# Patient Record
Sex: MALE | HISPANIC OR LATINO | URBAN - METROPOLITAN AREA
[De-identification: names, ages, dates, MRNs, and addresses within clinical notes are randomized per-mention and may not be internally consistent; named-entity substitution may affect disease eponyms.]

---

## 2018-06-28 ENCOUNTER — TRANSFERRED RECORDS (OUTPATIENT)
Dept: HEALTH INFORMATION MANAGEMENT | Facility: CLINIC | Age: 38
End: 2018-06-28

## 2019-01-30 DIAGNOSIS — Z31.41 FERTILITY TESTING: Primary | ICD-10-CM

## 2022-10-23 ENCOUNTER — APPOINTMENT (OUTPATIENT)
Dept: GENERAL RADIOLOGY | Facility: CLINIC | Age: 42
End: 2022-10-23
Attending: EMERGENCY MEDICINE
Payer: COMMERCIAL

## 2022-10-23 ENCOUNTER — HOSPITAL ENCOUNTER (INPATIENT)
Facility: CLINIC | Age: 42
LOS: 3 days | Discharge: HOME | End: 2022-10-26
Attending: EMERGENCY MEDICINE | Admitting: INTERNAL MEDICINE
Payer: COMMERCIAL

## 2022-10-23 DIAGNOSIS — S61.209A DEGLOVING INJURY OF FINGER, INITIAL ENCOUNTER: ICD-10-CM

## 2022-10-23 DIAGNOSIS — U07.1 INFECTION DUE TO 2019 NOVEL CORONAVIRUS: ICD-10-CM

## 2022-10-23 PROBLEM — E78.2 MIXED HYPERLIPIDEMIA: Chronic | Status: ACTIVE | Noted: 2022-10-23

## 2022-10-23 PROBLEM — E03.9 HYPOTHYROIDISM: Chronic | Status: ACTIVE | Noted: 2022-10-23

## 2022-10-23 LAB
ALBUMIN SERPL-MCNC: 4.1 G/DL (ref 3.4–5)
ALP SERPL-CCNC: 66 U/L (ref 40–150)
ALT SERPL W P-5'-P-CCNC: 40 U/L (ref 0–70)
ANION GAP SERPL CALCULATED.3IONS-SCNC: 4 MMOL/L (ref 3–14)
AST SERPL W P-5'-P-CCNC: 21 U/L (ref 0–45)
BASOPHILS # BLD MANUAL: 0 10E3/UL (ref 0–0.2)
BASOPHILS NFR BLD MANUAL: 0 %
BILIRUB SERPL-MCNC: 0.3 MG/DL (ref 0.2–1.3)
BUN SERPL-MCNC: 25 MG/DL (ref 7–30)
CALCIUM SERPL-MCNC: 9 MG/DL (ref 8.5–10.1)
CHLORIDE BLD-SCNC: 110 MMOL/L (ref 94–109)
CO2 SERPL-SCNC: 28 MMOL/L (ref 20–32)
CREAT SERPL-MCNC: 1.22 MG/DL (ref 0.66–1.25)
CRP SERPL-MCNC: <2.9 MG/L (ref 0–8)
EOSINOPHIL # BLD MANUAL: 0.1 10E3/UL (ref 0–0.7)
EOSINOPHIL NFR BLD MANUAL: 1 %
ERYTHROCYTE [DISTWIDTH] IN BLOOD BY AUTOMATED COUNT: 12.7 % (ref 10–15)
GFR SERPL CREATININE-BSD FRML MDRD: 75 ML/MIN/1.73M2
GLUCOSE BLD-MCNC: 111 MG/DL (ref 70–99)
HCT VFR BLD AUTO: 43 % (ref 40–53)
HGB BLD-MCNC: 14.5 G/DL (ref 13.3–17.7)
HOLD SPECIMEN: NORMAL
INR PPP: 1.06 (ref 0.85–1.15)
LYMPHOCYTES # BLD MANUAL: 4 10E3/UL (ref 0.8–5.3)
LYMPHOCYTES NFR BLD MANUAL: 31 %
MCH RBC QN AUTO: 29.3 PG (ref 26.5–33)
MCHC RBC AUTO-ENTMCNC: 33.7 G/DL (ref 31.5–36.5)
MCV RBC AUTO: 87 FL (ref 78–100)
MONOCYTES # BLD MANUAL: 0.9 10E3/UL (ref 0–1.3)
MONOCYTES NFR BLD MANUAL: 7 %
NEUTROPHILS # BLD MANUAL: 7.8 10E3/UL (ref 1.6–8.3)
NEUTROPHILS NFR BLD MANUAL: 61 %
PLAT MORPH BLD: NORMAL
PLATELET # BLD AUTO: 147 10E3/UL (ref 150–450)
POTASSIUM BLD-SCNC: 3.7 MMOL/L (ref 3.4–5.3)
PROT SERPL-MCNC: 7.7 G/DL (ref 6.8–8.8)
RBC # BLD AUTO: 4.95 10E6/UL (ref 4.4–5.9)
RBC MORPH BLD: NORMAL
SARS-COV-2 RNA RESP QL NAA+PROBE: POSITIVE
SODIUM SERPL-SCNC: 142 MMOL/L (ref 133–144)
WBC # BLD AUTO: 12.8 10E3/UL (ref 4–11)

## 2022-10-23 PROCEDURE — 250N000011 HC RX IP 250 OP 636

## 2022-10-23 PROCEDURE — 82040 ASSAY OF SERUM ALBUMIN: CPT | Performed by: INTERNAL MEDICINE

## 2022-10-23 PROCEDURE — 71046 X-RAY EXAM CHEST 2 VIEWS: CPT

## 2022-10-23 PROCEDURE — 85027 COMPLETE CBC AUTOMATED: CPT | Performed by: INTERNAL MEDICINE

## 2022-10-23 PROCEDURE — 73130 X-RAY EXAM OF HAND: CPT | Mod: LT

## 2022-10-23 PROCEDURE — 86140 C-REACTIVE PROTEIN: CPT | Performed by: INTERNAL MEDICINE

## 2022-10-23 PROCEDURE — C9803 HOPD COVID-19 SPEC COLLECT: HCPCS

## 2022-10-23 PROCEDURE — 99285 EMERGENCY DEPT VISIT HI MDM: CPT | Mod: 25

## 2022-10-23 PROCEDURE — G0378 HOSPITAL OBSERVATION PER HR: HCPCS

## 2022-10-23 PROCEDURE — 64450 NJX AA&/STRD OTHER PN/BRANCH: CPT | Mod: F2

## 2022-10-23 PROCEDURE — 80053 COMPREHEN METABOLIC PANEL: CPT | Performed by: INTERNAL MEDICINE

## 2022-10-23 PROCEDURE — 96375 TX/PRO/DX INJ NEW DRUG ADDON: CPT

## 2022-10-23 PROCEDURE — 96365 THER/PROPH/DIAG IV INF INIT: CPT

## 2022-10-23 PROCEDURE — 120N000001 HC R&B MED SURG/OB

## 2022-10-23 PROCEDURE — 36415 COLL VENOUS BLD VENIPUNCTURE: CPT | Performed by: EMERGENCY MEDICINE

## 2022-10-23 PROCEDURE — 99222 1ST HOSP IP/OBS MODERATE 55: CPT | Mod: AI | Performed by: INTERNAL MEDICINE

## 2022-10-23 PROCEDURE — 85007 BL SMEAR W/DIFF WBC COUNT: CPT | Performed by: INTERNAL MEDICINE

## 2022-10-23 PROCEDURE — 85610 PROTHROMBIN TIME: CPT | Performed by: INTERNAL MEDICINE

## 2022-10-23 PROCEDURE — 250N000011 HC RX IP 250 OP 636: Performed by: EMERGENCY MEDICINE

## 2022-10-23 PROCEDURE — 85379 FIBRIN DEGRADATION QUANT: CPT | Performed by: INTERNAL MEDICINE

## 2022-10-23 PROCEDURE — U0005 INFEC AGEN DETEC AMPLI PROBE: HCPCS | Performed by: EMERGENCY MEDICINE

## 2022-10-23 RX ORDER — LIDOCAINE 40 MG/G
CREAM TOPICAL
Status: DISCONTINUED | OUTPATIENT
Start: 2022-10-23 | End: 2022-10-26 | Stop reason: HOSPADM

## 2022-10-23 RX ORDER — ONDANSETRON 4 MG/1
4 TABLET, ORALLY DISINTEGRATING ORAL EVERY 6 HOURS PRN
Status: DISCONTINUED | OUTPATIENT
Start: 2022-10-23 | End: 2022-10-26 | Stop reason: HOSPADM

## 2022-10-23 RX ORDER — ACETAMINOPHEN 500 MG
500-1000 TABLET ORAL EVERY 6 HOURS PRN
COMMUNITY

## 2022-10-23 RX ORDER — LEVOTHYROXINE SODIUM 137 UG/1
137 TABLET ORAL DAILY
COMMUNITY

## 2022-10-23 RX ORDER — OXYCODONE HYDROCHLORIDE 5 MG/1
5 TABLET ORAL EVERY 4 HOURS PRN
Status: DISCONTINUED | OUTPATIENT
Start: 2022-10-23 | End: 2022-10-25

## 2022-10-23 RX ORDER — CEFAZOLIN SODIUM 2 G/100ML
2 INJECTION, SOLUTION INTRAVENOUS EVERY 8 HOURS
Status: DISCONTINUED | OUTPATIENT
Start: 2022-10-24 | End: 2022-10-24

## 2022-10-23 RX ORDER — ACETAMINOPHEN 325 MG/1
650 TABLET ORAL EVERY 6 HOURS PRN
Status: DISCONTINUED | OUTPATIENT
Start: 2022-10-23 | End: 2022-10-26 | Stop reason: HOSPADM

## 2022-10-23 RX ORDER — LIDOCAINE 40 MG/G
CREAM TOPICAL
Status: DISCONTINUED | OUTPATIENT
Start: 2022-10-23 | End: 2022-10-24

## 2022-10-23 RX ORDER — FENTANYL CITRATE 50 UG/ML
100 INJECTION, SOLUTION INTRAMUSCULAR; INTRAVENOUS ONCE
Status: COMPLETED | OUTPATIENT
Start: 2022-10-23 | End: 2022-10-23

## 2022-10-23 RX ORDER — POLYETHYLENE GLYCOL 3350 17 G/17G
17 POWDER, FOR SOLUTION ORAL DAILY PRN
Status: DISCONTINUED | OUTPATIENT
Start: 2022-10-23 | End: 2022-10-26 | Stop reason: HOSPADM

## 2022-10-23 RX ORDER — HYDROMORPHONE HYDROCHLORIDE 1 MG/ML
.5-1 INJECTION, SOLUTION INTRAMUSCULAR; INTRAVENOUS; SUBCUTANEOUS
Status: DISCONTINUED | OUTPATIENT
Start: 2022-10-23 | End: 2022-10-24

## 2022-10-23 RX ORDER — CEFAZOLIN SODIUM 2 G/100ML
2 INJECTION, SOLUTION INTRAVENOUS ONCE
Status: COMPLETED | OUTPATIENT
Start: 2022-10-23 | End: 2022-10-23

## 2022-10-23 RX ORDER — ONDANSETRON 2 MG/ML
4 INJECTION INTRAMUSCULAR; INTRAVENOUS EVERY 6 HOURS PRN
Status: DISCONTINUED | OUTPATIENT
Start: 2022-10-23 | End: 2022-10-26 | Stop reason: HOSPADM

## 2022-10-23 RX ORDER — PROCHLORPERAZINE 25 MG
25 SUPPOSITORY, RECTAL RECTAL EVERY 12 HOURS PRN
Status: DISCONTINUED | OUTPATIENT
Start: 2022-10-23 | End: 2022-10-26 | Stop reason: HOSPADM

## 2022-10-23 RX ORDER — IBUPROFEN 600 MG/1
600 TABLET, FILM COATED ORAL EVERY 6 HOURS PRN
Status: DISCONTINUED | OUTPATIENT
Start: 2022-10-23 | End: 2022-10-26 | Stop reason: HOSPADM

## 2022-10-23 RX ORDER — ACETAMINOPHEN 650 MG/1
650 SUPPOSITORY RECTAL EVERY 6 HOURS PRN
Status: DISCONTINUED | OUTPATIENT
Start: 2022-10-23 | End: 2022-10-26 | Stop reason: HOSPADM

## 2022-10-23 RX ORDER — BISACODYL 10 MG
10 SUPPOSITORY, RECTAL RECTAL DAILY PRN
Status: DISCONTINUED | OUTPATIENT
Start: 2022-10-23 | End: 2022-10-26 | Stop reason: HOSPADM

## 2022-10-23 RX ORDER — FENTANYL CITRATE 50 UG/ML
INJECTION, SOLUTION INTRAMUSCULAR; INTRAVENOUS
Status: COMPLETED
Start: 2022-10-23 | End: 2022-10-23

## 2022-10-23 RX ORDER — AMOXICILLIN 250 MG
1 CAPSULE ORAL 2 TIMES DAILY PRN
Status: DISCONTINUED | OUTPATIENT
Start: 2022-10-23 | End: 2022-10-26 | Stop reason: HOSPADM

## 2022-10-23 RX ORDER — PROCHLORPERAZINE MALEATE 5 MG
10 TABLET ORAL EVERY 6 HOURS PRN
Status: DISCONTINUED | OUTPATIENT
Start: 2022-10-23 | End: 2022-10-26 | Stop reason: HOSPADM

## 2022-10-23 RX ORDER — HYDROMORPHONE HYDROCHLORIDE 1 MG/ML
.5-1 INJECTION, SOLUTION INTRAMUSCULAR; INTRAVENOUS; SUBCUTANEOUS
Status: DISCONTINUED | OUTPATIENT
Start: 2022-10-23 | End: 2022-10-26 | Stop reason: HOSPADM

## 2022-10-23 RX ORDER — AMOXICILLIN 250 MG
2 CAPSULE ORAL 2 TIMES DAILY PRN
Status: DISCONTINUED | OUTPATIENT
Start: 2022-10-23 | End: 2022-10-26 | Stop reason: HOSPADM

## 2022-10-23 RX ADMIN — FENTANYL CITRATE 100 MCG: 50 INJECTION, SOLUTION INTRAMUSCULAR; INTRAVENOUS at 19:25

## 2022-10-23 RX ADMIN — HYDROMORPHONE HYDROCHLORIDE 1 MG: 1 INJECTION, SOLUTION INTRAMUSCULAR; INTRAVENOUS; SUBCUTANEOUS at 21:15

## 2022-10-23 RX ADMIN — CEFAZOLIN SODIUM 2 G: 2 INJECTION, SOLUTION INTRAVENOUS at 19:55

## 2022-10-23 ASSESSMENT — ENCOUNTER SYMPTOMS: WOUND: 1

## 2022-10-23 ASSESSMENT — ACTIVITIES OF DAILY LIVING (ADL)
ADLS_ACUITY_SCORE: 35

## 2022-10-23 NOTE — ED TRIAGE NOTES
30 minutes PTA was working in garage and finger got caught in a machine. L middle finger disfigured, appears partially amputated with bone visible. Bleeding controlled.      Triage Assessment       Row Name 10/23/22 3480       Triage Assessment (Adult)    Airway WDL WDL       Respiratory WDL    Respiratory WDL WDL       Skin Circulation/Temperature WDL    Skin Circulation/Temperature WDL WDL       Cardiac WDL    Cardiac WDL WDL       Peripheral/Neurovascular WDL    Peripheral Neurovascular WDL WDL       Cognitive/Neuro/Behavioral WDL    Cognitive/Neuro/Behavioral WDL WDL

## 2022-10-23 NOTE — ED PROVIDER NOTES
"  History   Chief Complaint:  Hand Injury       The history is provided by the patient.      Mode Wolfe is a right-handed 42 year old male with history of hypothyroidism who presents with a hand injury. Patient reports he got his hand stuck in a machine while working on concrete at 1800, resulting in a significant laceration to his left middle finger. Rates the pain is an 8/10.     Review of Systems   Skin: Positive for wound.   All other systems reviewed and are negative.    Allergies:  No Known Allergies    Medications:  Synthroid     Past Medical History:     Hypothyroidism   Migraine    Family History:    Father - Hypertension, Thyroid disease, High cholesterol     Social History:  The patient presents to the ED with siblings and mother.   PCP: No Ref-Primary, Physician     Physical Exam     Patient Vitals for the past 24 hrs:   BP Temp Temp src Pulse Resp SpO2 Height Weight   10/23/22 2230 116/77 -- -- 60 18 99 % -- --   10/23/22 2210 -- -- -- 65 17 -- -- --   10/23/22 2100 -- -- -- 61 18 96 % -- --   10/23/22 1945 -- -- -- 73 17 -- -- --   10/23/22 1930 110/81 -- -- 67 15 93 % -- --   10/23/22 1915 -- -- -- 64 26 97 % -- --   10/23/22 1900 (!) 111/90 -- -- 66 22 96 % -- --   10/23/22 1854 117/78 -- -- 67 14 96 % -- --   10/23/22 1800 (!) 181/158 96.9  F (36.1  C) Temporal 75 16 97 % 1.676 m (5' 6\") 68 kg (150 lb)       Physical Exam  Constitutional: Well developed, nontox appearance  Head: Atraumatic.   Neck:  no stridor  Eyes: no scleral icterus  Cardiovascular: RRR, 2+ left radial pulse  Pulmonary/Chest: nml resp effort  Ext: Warm, well perfused   LUE: Degloved third finger with fracture dislocation at DIP, middle phalanx exposed, limited range of motion  Neurological: A&O, symmetric facies, moves ext x4  Skin: Skin is warm and dry.  Psychiatric: Behavior is normal. Thought content normal.   Nursing note and vitals reviewed.                  Emergency Department Course     Imaging:  XR Chest 2 Views "   Final Result   IMPRESSION: Negative chest.      XR Hand Left G/E 3 Views   Final Result   IMPRESSION: Near amputation of the distal portion of the middle finger, the distal phalanx is dislocated ulnarly and proximally and the soft tissues along the middle phalanx are displaced ulnarly along with the dislocated distal phalanx, exposing the    middle phalanx. There is a small bone fragment of the base of the distal phalanx which maintains articulation with the head of the middle phalanx. There is gas in the soft tissues near the base of the middle finger and the hand.        Report per radiology    Laboratory:  Labs Ordered and Resulted from Time of ED Arrival to Time of ED Departure   COVID-19 VIRUS (CORONAVIRUS) BY PCR - Abnormal       Result Value    SARS CoV2 PCR Positive (*)    COMPREHENSIVE METABOLIC PANEL - Abnormal    Sodium 142      Potassium 3.7      Chloride 110 (*)     Carbon Dioxide (CO2) 28      Anion Gap 4      Urea Nitrogen 25      Creatinine 1.22      Calcium 9.0      Glucose 111 (*)     Alkaline Phosphatase 66      AST 21      ALT 40      Protein Total 7.7      Albumin 4.1      Bilirubin Total 0.3      GFR Estimate 75     CBC WITH PLATELETS AND DIFFERENTIAL - Abnormal    WBC Count 12.8 (*)     RBC Count 4.95      Hemoglobin 14.5      Hematocrit 43.0      MCV 87      MCH 29.3      MCHC 33.7      RDW 12.7      Platelet Count 147 (*)    INR - Normal    INR 1.06     CRP INFLAMMATION - Normal    CRP Inflammation <2.9     D DIMER QUANTITATIVE - Normal    D-Dimer Quantitative <0.27     DIFFERENTIAL    % Neutrophils 61      % Lymphocytes 31      % Monocytes 7      % Eosinophils 1      % Basophils 0      Absolute Neutrophils 7.8      Absolute Lymphocytes 4.0      Absolute Monocytes 0.9      Absolute Eosinophils 0.1      Absolute Basophils 0.0      RBC Morphology Confirmed RBC Indices      Platelet Assessment        Value: Automated Count Confirmed. Platelet morphology is normal.        Procedures   Digital  Block       Procedure: Digital Block    Indication: Wound care    Consent: Verbal     Preparation: Alcohol    Anesthesia/Sedation: Bupivacaine - 0.5%     Location: L third (middle) finger    Procedure Detail: A digital block was performed on the indicated digit with the above anesthetic.     Patient status: The patient tolerated the procedure well: Yes. There were no complications.      Emergency Department Course:    Reviewed:  I reviewed nursing notes, vitals, past medical history and Care Everywhere    Assessments:  ED Course as of 10/24/22 0159   Sun Oct 23, 2022   1922 I obtained history and examined the patient.    2032 I rechecked the patient and explained findings.    2033 Spoke to Dr. Crespo from Orthopedics regarding the patient.    2105 I rechecked the patient and explained findings.    2115 I spoke to Dr. Lau, hospitalist, who accepts the patient for admission.      Interventions:  Medications   ceFAZolin (ANCEF) 2 g in 100 mL D5W intermittent infusion (has no administration in time range)   HYDROmorphone (PF) (DILAUDID) injection 0.5-1 mg (0.5 mg Intravenous Not Given 10/24/22 0059)   melatonin tablet 1 mg (has no administration in time range)   ondansetron (ZOFRAN ODT) ODT tab 4 mg (has no administration in time range)     Or   ondansetron (ZOFRAN) injection 4 mg (has no administration in time range)   lidocaine 1 % 0.1-1 mL (has no administration in time range)   lidocaine (LMX4) cream (has no administration in time range)   sodium chloride (PF) 0.9% PF flush 3 mL (has no administration in time range)   sodium chloride (PF) 0.9% PF flush 3 mL (3 mLs Intracatheter Given 10/24/22 0101)   acetaminophen (TYLENOL) tablet 650 mg (has no administration in time range)     Or   acetaminophen (TYLENOL) Suppository 650 mg (has no administration in time range)   ibuprofen (ADVIL/MOTRIN) tablet 600 mg (has no administration in time range)   oxyCODONE (ROXICODONE) tablet 5 mg (has no administration in time  range)   HYDROmorphone (PF) (DILAUDID) injection 0.5-1 mg (1 mg Intravenous Given 10/24/22 0059)   senna-docusate (SENOKOT-S/PERICOLACE) 8.6-50 MG per tablet 1 tablet (has no administration in time range)     Or   senna-docusate (SENOKOT-S/PERICOLACE) 8.6-50 MG per tablet 2 tablet (has no administration in time range)   polyethylene glycol (MIRALAX) Packet 17 g (has no administration in time range)   bisacodyl (DULCOLAX) suppository 10 mg (has no administration in time range)   prochlorperazine (COMPAZINE) injection 10 mg (has no administration in time range)     Or   prochlorperazine (COMPAZINE) tablet 10 mg (has no administration in time range)     Or   prochlorperazine (COMPAZINE) suppository 25 mg (has no administration in time range)   lidocaine 1 % 0.1-1 mL (has no administration in time range)   lidocaine (LMX4) cream (has no administration in time range)   sodium chloride (PF) 0.9% PF flush 3 mL (3 mLs Intracatheter Given 10/24/22 0100)   sodium chloride (PF) 0.9% PF flush 3 mL (has no administration in time range)   Medication instructions: Do NOT use nebulized medications (has no administration in time range)   fentaNYL (PF) (SUBLIMAZE) injection 100 mcg (100 mcg Intravenous Given 10/23/22 1925)   ceFAZolin (ANCEF) 2 g in 100 mL D5W intermittent infusion (0 g Intravenous Stopped 10/23/22 2221)   HYDROmorphone (DILAUDID) injection 1 mg (1 mg Intravenous Given 10/23/22 2115)     Disposition:  The patient was admitted to the hospital under the care of Dr. Lau.     Impression & Plan     CMS Diagnoses: None    Medical Decision Makin year old male presenting w/ open fracture and degloving injury to left third finger, patient is right-hand dominant     Last meal at ~1500     Presentation consistent with open fracture of left third finger with degloving.  Ancef given as noted above.  Tetanus up-to-date (2019).  No other concern for significant trauma other than the areas imaged as above based on history and  physical exam.  Imaging sig for acute osseous abnormalities as described above.  Interventions as noted above with improvement in symptoms.  Digital nerve block performed as noted above.  I discussed the patient with on-call orthopedics who recommended admission observation for operative management tomorrow.  Patient was counseled on all results, diagnosis and disposition.  They are understanding and agreeable to plan. Patient discharged in stable condition.       Diagnosis:    ICD-10-CM    1. Degloving injury of finger, initial encounter  S61.209A       2. Infection due to 2019 novel coronavirus  U07.1           Scribe Disclosure:  I, FATMATA HOOD, am serving as a scribe at 6:59 PM on 10/23/2022 to document services personally performed by Guille Boyd MD based on my observations and the provider's statements to me.          Guille Boyd MD  10/24/22 0159

## 2022-10-24 ENCOUNTER — ANESTHESIA (OUTPATIENT)
Dept: SURGERY | Facility: CLINIC | Age: 42
End: 2022-10-24
Payer: COMMERCIAL

## 2022-10-24 ENCOUNTER — ANESTHESIA EVENT (OUTPATIENT)
Dept: SURGERY | Facility: CLINIC | Age: 42
End: 2022-10-24
Payer: COMMERCIAL

## 2022-10-24 ENCOUNTER — VIRTUAL VISIT (OUTPATIENT)
Dept: INTERPRETER SERVICES | Facility: CLINIC | Age: 42
End: 2022-10-24

## 2022-10-24 LAB — D DIMER PPP FEU-MCNC: <0.27 UG/ML FEU (ref 0–0.5)

## 2022-10-24 PROCEDURE — G0378 HOSPITAL OBSERVATION PER HR: HCPCS

## 2022-10-24 PROCEDURE — 250N000013 HC RX MED GY IP 250 OP 250 PS 637: Performed by: INTERNAL MEDICINE

## 2022-10-24 PROCEDURE — 258N000003 HC RX IP 258 OP 636: Performed by: REGISTERED NURSE

## 2022-10-24 PROCEDURE — 272N000001 HC OR GENERAL SUPPLY STERILE: Performed by: ORTHOPAEDIC SURGERY

## 2022-10-24 PROCEDURE — 258N000003 HC RX IP 258 OP 636: Performed by: PHYSICIAN ASSISTANT

## 2022-10-24 PROCEDURE — 360N000075 HC SURGERY LEVEL 2, PER MIN: Performed by: ORTHOPAEDIC SURGERY

## 2022-10-24 PROCEDURE — 120N000001 HC R&B MED SURG/OB

## 2022-10-24 PROCEDURE — 88300 SURGICAL PATH GROSS: CPT | Mod: 26 | Performed by: PATHOLOGY

## 2022-10-24 PROCEDURE — 88300 SURGICAL PATH GROSS: CPT | Mod: TC | Performed by: ORTHOPAEDIC SURGERY

## 2022-10-24 PROCEDURE — 250N000011 HC RX IP 250 OP 636: Performed by: INTERNAL MEDICINE

## 2022-10-24 PROCEDURE — 250N000011 HC RX IP 250 OP 636: Performed by: PHYSICIAN ASSISTANT

## 2022-10-24 PROCEDURE — 0HRGX73 REPLACEMENT OF LEFT HAND SKIN WITH AUTOLOGOUS TISSUE SUBSTITUTE, FULL THICKNESS, EXTERNAL APPROACH: ICD-10-PCS | Performed by: ORTHOPAEDIC SURGERY

## 2022-10-24 PROCEDURE — 0X6R0Z1 DETACHMENT AT LEFT MIDDLE FINGER, HIGH, OPEN APPROACH: ICD-10-PCS | Performed by: ORTHOPAEDIC SURGERY

## 2022-10-24 PROCEDURE — 250N000009 HC RX 250: Performed by: ORTHOPAEDIC SURGERY

## 2022-10-24 PROCEDURE — 99232 SBSQ HOSP IP/OBS MODERATE 35: CPT | Performed by: INTERNAL MEDICINE

## 2022-10-24 PROCEDURE — 250N000011 HC RX IP 250 OP 636: Performed by: REGISTERED NURSE

## 2022-10-24 PROCEDURE — 370N000017 HC ANESTHESIA TECHNICAL FEE, PER MIN: Performed by: ORTHOPAEDIC SURGERY

## 2022-10-24 RX ORDER — CEFAZOLIN SODIUM/WATER 2 G/20 ML
2 SYRINGE (ML) INTRAVENOUS
Status: DISCONTINUED | OUTPATIENT
Start: 2022-10-24 | End: 2022-10-24

## 2022-10-24 RX ORDER — PROPOFOL 10 MG/ML
INJECTION, EMULSION INTRAVENOUS CONTINUOUS PRN
Status: DISCONTINUED | OUTPATIENT
Start: 2022-10-24 | End: 2022-10-24

## 2022-10-24 RX ORDER — PROPOFOL 10 MG/ML
INJECTION, EMULSION INTRAVENOUS PRN
Status: DISCONTINUED | OUTPATIENT
Start: 2022-10-24 | End: 2022-10-24

## 2022-10-24 RX ORDER — SODIUM CHLORIDE, SODIUM LACTATE, POTASSIUM CHLORIDE, CALCIUM CHLORIDE 600; 310; 30; 20 MG/100ML; MG/100ML; MG/100ML; MG/100ML
INJECTION, SOLUTION INTRAVENOUS CONTINUOUS
Status: DISCONTINUED | OUTPATIENT
Start: 2022-10-24 | End: 2022-10-25

## 2022-10-24 RX ORDER — FENTANYL CITRATE 0.05 MG/ML
INJECTION, SOLUTION INTRAMUSCULAR; INTRAVENOUS PRN
Status: DISCONTINUED | OUTPATIENT
Start: 2022-10-24 | End: 2022-10-24

## 2022-10-24 RX ORDER — NALOXONE HYDROCHLORIDE 0.4 MG/ML
0.4 INJECTION, SOLUTION INTRAMUSCULAR; INTRAVENOUS; SUBCUTANEOUS
Status: DISCONTINUED | OUTPATIENT
Start: 2022-10-24 | End: 2022-10-26 | Stop reason: HOSPADM

## 2022-10-24 RX ORDER — CEPHALEXIN 500 MG/1
500 CAPSULE ORAL EVERY 12 HOURS SCHEDULED
Status: DISCONTINUED | OUTPATIENT
Start: 2022-10-25 | End: 2022-10-26 | Stop reason: HOSPADM

## 2022-10-24 RX ORDER — DEXAMETHASONE SODIUM PHOSPHATE 4 MG/ML
INJECTION, SOLUTION INTRA-ARTICULAR; INTRALESIONAL; INTRAMUSCULAR; INTRAVENOUS; SOFT TISSUE PRN
Status: DISCONTINUED | OUTPATIENT
Start: 2022-10-24 | End: 2022-10-24

## 2022-10-24 RX ORDER — NALOXONE HYDROCHLORIDE 0.4 MG/ML
0.2 INJECTION, SOLUTION INTRAMUSCULAR; INTRAVENOUS; SUBCUTANEOUS
Status: DISCONTINUED | OUTPATIENT
Start: 2022-10-24 | End: 2022-10-26 | Stop reason: HOSPADM

## 2022-10-24 RX ORDER — CEFAZOLIN SODIUM/WATER 2 G/20 ML
2 SYRINGE (ML) INTRAVENOUS SEE ADMIN INSTRUCTIONS
Status: DISCONTINUED | OUTPATIENT
Start: 2022-10-24 | End: 2022-10-24

## 2022-10-24 RX ORDER — LIDOCAINE 40 MG/G
CREAM TOPICAL
Status: DISCONTINUED | OUTPATIENT
Start: 2022-10-24 | End: 2022-10-25

## 2022-10-24 RX ORDER — ONDANSETRON 2 MG/ML
INJECTION INTRAMUSCULAR; INTRAVENOUS PRN
Status: DISCONTINUED | OUTPATIENT
Start: 2022-10-24 | End: 2022-10-24

## 2022-10-24 RX ORDER — SODIUM CHLORIDE, SODIUM LACTATE, POTASSIUM CHLORIDE, CALCIUM CHLORIDE 600; 310; 30; 20 MG/100ML; MG/100ML; MG/100ML; MG/100ML
INJECTION, SOLUTION INTRAVENOUS CONTINUOUS PRN
Status: DISCONTINUED | OUTPATIENT
Start: 2022-10-24 | End: 2022-10-24

## 2022-10-24 RX ORDER — BUPIVACAINE HYDROCHLORIDE 5 MG/ML
INJECTION, SOLUTION PERINEURAL PRN
Status: DISCONTINUED | OUTPATIENT
Start: 2022-10-24 | End: 2022-10-24

## 2022-10-24 RX ORDER — CEFAZOLIN SODIUM 1 G/3ML
1 INJECTION, POWDER, FOR SOLUTION INTRAMUSCULAR; INTRAVENOUS EVERY 8 HOURS
Status: COMPLETED | OUTPATIENT
Start: 2022-10-24 | End: 2022-10-25

## 2022-10-24 RX ADMIN — HYDROMORPHONE HYDROCHLORIDE 1 MG: 1 INJECTION, SOLUTION INTRAMUSCULAR; INTRAVENOUS; SUBCUTANEOUS at 00:59

## 2022-10-24 RX ADMIN — SODIUM CHLORIDE, POTASSIUM CHLORIDE, SODIUM LACTATE AND CALCIUM CHLORIDE: 600; 310; 30; 20 INJECTION, SOLUTION INTRAVENOUS at 17:10

## 2022-10-24 RX ADMIN — DEXAMETHASONE SODIUM PHOSPHATE 4 MG: 4 INJECTION, SOLUTION INTRA-ARTICULAR; INTRALESIONAL; INTRAMUSCULAR; INTRAVENOUS; SOFT TISSUE at 14:59

## 2022-10-24 RX ADMIN — ACETAMINOPHEN 650 MG: 325 TABLET, FILM COATED ORAL at 18:11

## 2022-10-24 RX ADMIN — CEFAZOLIN SODIUM 2 G: 2 INJECTION, SOLUTION INTRAVENOUS at 14:44

## 2022-10-24 RX ADMIN — PROPOFOL 150 MCG/KG/MIN: 10 INJECTION, EMULSION INTRAVENOUS at 14:54

## 2022-10-24 RX ADMIN — CEFAZOLIN SODIUM 2 G: 2 INJECTION, SOLUTION INTRAVENOUS at 05:37

## 2022-10-24 RX ADMIN — ONDANSETRON 4 MG: 2 INJECTION INTRAMUSCULAR; INTRAVENOUS at 06:28

## 2022-10-24 RX ADMIN — MIDAZOLAM 2 MG: 1 INJECTION INTRAMUSCULAR; INTRAVENOUS at 14:50

## 2022-10-24 RX ADMIN — DEXAMETHASONE SODIUM PHOSPHATE 4 MG: 4 INJECTION, SOLUTION INTRA-ARTICULAR; INTRALESIONAL; INTRAMUSCULAR; INTRAVENOUS; SOFT TISSUE at 15:01

## 2022-10-24 RX ADMIN — CEFAZOLIN SODIUM 2 G: 2 INJECTION, SOLUTION INTRAVENOUS at 11:41

## 2022-10-24 RX ADMIN — OXYCODONE HYDROCHLORIDE 5 MG: 5 TABLET ORAL at 18:19

## 2022-10-24 RX ADMIN — HYDROMORPHONE HYDROCHLORIDE 1 MG: 1 INJECTION, SOLUTION INTRAMUSCULAR; INTRAVENOUS; SUBCUTANEOUS at 20:10

## 2022-10-24 RX ADMIN — SODIUM CHLORIDE, POTASSIUM CHLORIDE, SODIUM LACTATE AND CALCIUM CHLORIDE: 600; 310; 30; 20 INJECTION, SOLUTION INTRAVENOUS at 14:43

## 2022-10-24 RX ADMIN — HYDROMORPHONE HYDROCHLORIDE 0.5 MG: 1 INJECTION, SOLUTION INTRAMUSCULAR; INTRAVENOUS; SUBCUTANEOUS at 05:41

## 2022-10-24 RX ADMIN — CEFAZOLIN 1 G: 1 INJECTION, POWDER, FOR SOLUTION INTRAMUSCULAR; INTRAVENOUS at 22:09

## 2022-10-24 RX ADMIN — ONDANSETRON 4 MG: 2 INJECTION INTRAMUSCULAR; INTRAVENOUS at 14:44

## 2022-10-24 RX ADMIN — PROPOFOL 40 MG: 10 INJECTION, EMULSION INTRAVENOUS at 14:54

## 2022-10-24 RX ADMIN — HYDROMORPHONE HYDROCHLORIDE 0.5 MG: 1 INJECTION, SOLUTION INTRAMUSCULAR; INTRAVENOUS; SUBCUTANEOUS at 09:24

## 2022-10-24 RX ADMIN — FENTANYL CITRATE 25 MCG: 50 INJECTION INTRAVENOUS at 14:55

## 2022-10-24 ASSESSMENT — ACTIVITIES OF DAILY LIVING (ADL)
ADLS_ACUITY_SCORE: 35

## 2022-10-24 ASSESSMENT — LIFESTYLE VARIABLES: TOBACCO_USE: 0

## 2022-10-24 ASSESSMENT — ENCOUNTER SYMPTOMS
SEIZURES: 0
DYSRHYTHMIAS: 0

## 2022-10-24 ASSESSMENT — COPD QUESTIONNAIRES: COPD: 0

## 2022-10-24 NOTE — ED NOTES
Patient reports pain is tolerable at this level. Denies additional pain medication at this time, will continue to monitor.

## 2022-10-24 NOTE — ANESTHESIA PREPROCEDURE EVALUATION
Anesthesia Pre-Procedure Evaluation    Patient: Mode Wolfe   MRN: [id number] : 1980        Procedure : Procedure(s):  IRRIGATION AND DEBRIDEMENT, REVISION AMPUTATION LEFT 3RD FINGER          History reviewed. No pertinent past medical history.   History reviewed. No pertinent surgical history.   No Known Allergies   Social History     Tobacco Use     Smoking status: Never     Smokeless tobacco: Not on file   Substance Use Topics     Alcohol use: No      Wt Readings from Last 1 Encounters:   10/23/22 68 kg (150 lb)        Anesthesia Evaluation            ROS/MED HX  ENT/Pulmonary:    (-) tobacco use, asthma, COPD and sleep apnea   Neurologic:    (-) no seizures and no CVA   Cardiovascular:     (+) Dyslipidemia ----- (-) hypertension, CAD, CHF and arrhythmias   METS/Exercise Tolerance:     Hematologic:       Musculoskeletal: Comment: Degloving injury to L 3rd finger      GI/Hepatic:    (-) GERD and liver disease   Renal/Genitourinary:    (-) renal disease   Endo:     (+) thyroid problem, hypothyroidism,  (-) Type I DM and Type II DM   Psychiatric/Substance Use:       Infectious Disease:       Malignancy:       Other:            Physical Exam    Airway        Mallampati: II   TM distance: > 3 FB   Neck ROM: full   Mouth opening: > 3 cm    Respiratory Devices and Support         Dental  no notable dental history         Cardiovascular          Rhythm and rate: regular     Pulmonary           breath sounds clear to auscultation           OUTSIDE LABS:  CBC:   Lab Results   Component Value Date    WBC 12.8 (H) 10/23/2022    HGB 14.5 10/23/2022    HCT 43.0 10/23/2022     (L) 10/23/2022     BMP:   Lab Results   Component Value Date     10/23/2022    POTASSIUM 3.7 10/23/2022    CHLORIDE 110 (H) 10/23/2022    CO2 28 10/23/2022    BUN 25 10/23/2022    CR 1.22 10/23/2022     (H) 10/23/2022     COAGS:   Lab Results   Component Value Date    INR 1.06 10/23/2022     POC: No results found for:  BGM, HCG, HCGS  HEPATIC:   Lab Results   Component Value Date    ALBUMIN 4.1 10/23/2022    PROTTOTAL 7.7 10/23/2022    ALT 40 10/23/2022    AST 21 10/23/2022    ALKPHOS 66 10/23/2022    BILITOTAL 0.3 10/23/2022     OTHER:   Lab Results   Component Value Date    ELIEZER 9.0 10/23/2022    CRP <2.9 10/23/2022       Anesthesia Plan    ASA Status:  2      Anesthesia Type: Peripheral Nerve Block.              Consents    Anesthesia Plan(s) and associated risks, benefits, and realistic alternatives discussed. Questions answered and patient/representative(s) expressed understanding.    - Discussed:     - Discussed with:  Patient         Postoperative Care    Pain management: Multi-modal analgesia.   PONV prophylaxis: Ondansetron (or other 5HT-3)     Comments:    Other Comments: Median and ulnar nerve blocks            Genaro Luong MD

## 2022-10-24 NOTE — PHARMACY-ADMISSION MEDICATION HISTORY
Pharmacy Medication History  Admission medication history interview status for the 10/23/2022  admission is complete. See EPIC admission navigator for prior to admission medications     Location of Interview: Patient room  Medication history sources: Patient and Surescripts    Significant changes made to the medication list:      In the past week, patient estimated taking medication this percent of the time: greater than 90%    Additional medication history information:       Medication reconciliation completed by provider prior to medication history? No    Time spent in this activity: 5min     Prior to Admission medications    Medication Sig Last Dose Taking? Auth Provider Long Term End Date   acetaminophen (TYLENOL) 500 MG tablet Take 500-1,000 mg by mouth every 6 hours as needed for mild pain  Yes Unknown, Entered By History     levothyroxine (SYNTHROID/LEVOTHROID) 137 MCG tablet Take 137 mcg by mouth daily  Yes Unknown, Entered By History Yes        The information provided in this note is only as accurate as the sources available at the time of update(s)   Consuelo WinD

## 2022-10-24 NOTE — PROGRESS NOTES
Pt arrived to this unit at ~10 am. A&O x4. Up inde, VSS.RA. Denied pain. L hand dressing dry and intact, NWB.On Covid precaution. NPO. Pt went to surgery at ~1440.

## 2022-10-24 NOTE — ED NOTES
[facility]  ED Nurse Handoff Report    ED Chief complaint: Hand Injury      ED Diagnosis:   Final diagnoses:   Degloving injury of finger, initial encounter   Infection due to 2019 novel coronavirus       Code Status: To be addressed by admitting provider    Allergies: No Known Allergies    Patient Story: Patient presents from home for a finger injury after using a  at home and his finger got stuck in machine.     Focused Assessment:  A&Ox4. English speaking. Denies cp, sob, cough, fevers, n/v/d. L middle finger de-gloving, visible bone. Wound cleaned and bandaged. Pain managed by medications in ED. Independent with ambulation and cares. Plan for surgery in the morning, NPO after midnight. VSS.    Treatments and/or interventions provided: XR hand, dilaudid, Ancef  Medications   ceFAZolin (ANCEF) 2 g in 100 mL D5W intermittent infusion (has no administration in time range)   HYDROmorphone (PF) (DILAUDID) injection 0.5-1 mg (has no administration in time range)   melatonin tablet 1 mg (has no administration in time range)   ondansetron (ZOFRAN ODT) ODT tab 4 mg (has no administration in time range)     Or   ondansetron (ZOFRAN) injection 4 mg (has no administration in time range)   lidocaine 1 % 0.1-1 mL (has no administration in time range)   lidocaine (LMX4) cream (has no administration in time range)   sodium chloride (PF) 0.9% PF flush 3 mL (has no administration in time range)   sodium chloride (PF) 0.9% PF flush 3 mL (has no administration in time range)   acetaminophen (TYLENOL) tablet 650 mg (has no administration in time range)     Or   acetaminophen (TYLENOL) Suppository 650 mg (has no administration in time range)   ibuprofen (ADVIL/MOTRIN) tablet 600 mg (has no administration in time range)   oxyCODONE (ROXICODONE) tablet 5 mg (has no administration in time range)   HYDROmorphone (PF) (DILAUDID) injection 0.5-1 mg (has no administration in time range)   senna-docusate  (SENOKOT-S/PERICOLACE) 8.6-50 MG per tablet 1 tablet (has no administration in time range)     Or   senna-docusate (SENOKOT-S/PERICOLACE) 8.6-50 MG per tablet 2 tablet (has no administration in time range)   polyethylene glycol (MIRALAX) Packet 17 g (has no administration in time range)   bisacodyl (DULCOLAX) suppository 10 mg (has no administration in time range)   prochlorperazine (COMPAZINE) injection 10 mg (has no administration in time range)     Or   prochlorperazine (COMPAZINE) tablet 10 mg (has no administration in time range)     Or   prochlorperazine (COMPAZINE) suppository 25 mg (has no administration in time range)   lidocaine 1 % 0.1-1 mL (has no administration in time range)   lidocaine (LMX4) cream (has no administration in time range)   sodium chloride (PF) 0.9% PF flush 3 mL (has no administration in time range)   sodium chloride (PF) 0.9% PF flush 3 mL (has no administration in time range)   Medication instructions: Do NOT use nebulized medications (has no administration in time range)   fentaNYL (PF) (SUBLIMAZE) injection 100 mcg (100 mcg Intravenous Given 10/23/22 1925)   ceFAZolin (ANCEF) 2 g in 100 mL D5W intermittent infusion (0 g Intravenous Stopped 10/23/22 2221)   HYDROmorphone (DILAUDID) injection 1 mg (1 mg Intravenous Given 10/23/22 2115)     Patient's response to treatments and/or interventions: decreased pain    To be done/followed up on inpatient unit:  Continue with plan of care per admitting MD.    Does this patient have any cognitive concerns?: None    Activity level - Baseline/Home:  Independent  Activity Level - Current:   Independent    Patient's Preferred language: Romansh   Needed?: No    Isolation: COVID r/o and special precautions  Infection: COVID r/o and special precautions  Patient tested for COVID 19 prior to admission: YES  Bariatric?: No    Vital Signs:   Vitals:    10/23/22 1945 10/23/22 2100 10/23/22 2210 10/23/22 2230   BP:    116/77   Pulse: 73 61 65 60    Resp: 17 18 17 18   Temp:       TempSrc:       SpO2:  96%  99%   Weight:       Height:           Cardiac Rhythm:     Was the PSS-3 completed:   Yes  What interventions are required if any?               Family Comments: family at bedside.  OBS brochure/video discussed/provided to patient/family: Yes              For the majority of the shift this patient's behavior was Green.   Behavioral interventions performed were na.    ED NURSE PHONE NUMBER: *[phone]

## 2022-10-24 NOTE — ED NOTES
Assumed care at this time.  Alexandria Lam RN,.......................................... 10/24/2022   12:00 AM

## 2022-10-24 NOTE — PROGRESS NOTES
Ortho Brief:    41 yo male with Left long finger degloving injury and near amputation of distal phalanx after getting finger caught in a machine while working in his garage.     1. IV abx.   2. OR tomorrow with Dr. Crespo   3. NPO p mn  4. Hold anticoagulation if taken

## 2022-10-24 NOTE — PROGRESS NOTES
[facility]    Medicine Progress Note - Hospitalist Service    Date of Admission:  10/23/2022    Assessment & Plan   Mode Wolfe is a 42 year old male admitted on 10/23/2022. He presents to the emergency department after a degloving injury to his left third finger while working with a  in his garage.     Degloving injury of left third finger:  -Ancef 2 g every 8 hours  -Orthopedic surgery consulted, aware of patient from emergency department, plan in place for partial digit amputation today  -N.p.o. a  -Acetaminophen, oxycodone, IV Dilaudid available as needed for pain control.  Patient did receive a digital block in the ED initially.   -As needed bowel regimen available     Confirmed COVID-19 infection    - Asymptomatic. Tested positive on 10/23  - vaccinated x 2  - CXR no infiltrate. CRP <2.9, D-dimer <0.27  - no specific covid directed treatment at this time, he is asymptomatic, not hypoxic and no infiltrate on xray  - continue special precautions  - not on dvt prophy at this time given anticipated surgery    Leukocytosis  Wbc 12.4. this is likely stress response to above injury. CXR without infiltrate.COVID+,but asymptomatic  - monitor    Hypothyroidism:   Most recent TSH well controlled in July 2022 through his primary care system.  -Resume prior to admission levothyroxine 137 mcg daily at discharge.  Held given observation admission.          Diet: NPO per Anesthesia Guidelines for Procedure/Surgery Except for: Meds    DVT Prophylaxis: Ambulate every shift  Mann Catheter: Not present  Central Lines: None  Cardiac Monitoring: None  Code Status: Full Code      Disposition Plan      Expected Discharge Date: 10/24/2022                The patient's care was discussed with the Bedside Nurse, Patient and Patient's Family.    Elizabeth Kebede MD  Hospitalist Service  [facility]  Securely message with the Vocera Web Console (learn more  here)  Text page via [facility] Paging/Directory         Clinically Significant Risk Factors Present on Admission                              ______________________________________________________________________    Interval History   Some pain in his finger,but controlled.   Denies sob, chest pain, n/v or abdominal pain.  Awaiting ortho eval.    Data reviewed today: I reviewed all medications, new labs and imaging results over the last 24 hours. I personally reviewed no images or EKG's today.    Physical Exam   Vital Signs: Temp: 96.9  F (36.1  C) Temp src: Temporal BP: 113/76 Pulse: 58   Resp: 18 SpO2: 95 % O2 Device: None (Room air)    Weight: 150 lbs 0 oz  General Appearance: Alert, awake and no apparent distress  Respiratory: CTAB, no wheezing  Cardiovascular: regular rate and rhythm  GI: soft and non-tender  Skin: warm and dry  Other: Left hand in dressing    Data   Recent Labs   Lab 10/23/22  1844   WBC 12.8*   HGB 14.5   MCV 87   *   INR 1.06      POTASSIUM 3.7   CHLORIDE 110*   CO2 28   BUN 25   CR 1.22   ANIONGAP 4   ELIEZER 9.0   *   ALBUMIN 4.1   PROTTOTAL 7.7   BILITOTAL 0.3   ALKPHOS 66   ALT 40   AST 21     Recent Results (from the past 24 hour(s))   XR Hand Left G/E 3 Views    Narrative    EXAM: XR HAND LEFT G/E 3 VIEWS  LOCATION: [facility]  DATE/TIME: 10/23/2022 7:51 PM    INDICATION: trauma to hand with 3rd finger degloving  COMPARISON: None.      Impression    IMPRESSION: Near amputation of the distal portion of the middle finger, the distal phalanx is dislocated ulnarly and proximally and the soft tissues along the middle phalanx are displaced ulnarly along with the dislocated distal phalanx, exposing the   middle phalanx. There is a small bone fragment of the base of the distal phalanx which maintains articulation with the head of the middle phalanx. There is gas in the soft tissues near the base of the middle finger and the hand.   XR Chest 2 Views     Narrative    EXAM: XR CHEST 2 VIEWS  LOCATION: [facility]  DATE/TIME: 10/23/2022 10:23 PM    INDICATION: covid positive, pre op clearance  COMPARISON: None.      Impression    IMPRESSION: Negative chest.     Medications     - MEDICATION INSTRUCTIONS -         ceFAZolin  2 g Intravenous Q8H     sodium chloride (PF)  3 mL Intracatheter Q8H     sodium chloride (PF)  3 mL Intracatheter Q8H

## 2022-10-24 NOTE — OP NOTE
Procedure Date: 10/24/2022    PREOPERATIVE DIAGNOSIS:  Left hand crush injury with traumatic amputation, left long finger.    POSTOPERATIVE DIAGNOSIS:  Left hand crush injury with traumatic amputation, left long finger.    PROCEDURE PERFORMED:    1.  Revision amputation, left long finger, to the level of PIP joint.  2.  Full-thickness skin graft volar aspect of the long finger, 3 x 2 cm.  3.  Left long finger digital nerve block.    COMPLICATIONS:  Emesis during surgery.    SURGEON:  Bert Crespo MD    ASSISTANTS:  Henrique Jackson PA-C, whose assistance was critical for positioning the patient, retraction during revision amputation, skin grafting.    INDICATIONS FOR PROCEDURE:  A 42-year-old male who caught his hand in a  with the above-named injuries.  He was rinsed and given antibiotics.  He is indicated for the above-named procedure.  Risks of his injury and surgery discussed included, but not limited to bleeding, infection, damage to surrounding neurovascular structures, need for further higher level amputation.  He understands and wished to proceed.  Consent signed.  We used an .    DESCRIPTION OF PROCEDURE:  The patient was identified in preoperative holding area per hospital policy, correct operative site marked, to the OR and OR table.  Some sedation given.  He was given a digital block after cleaning the skin with 0.5% plain Marcaine, aspirating and then injecting the digital nerves.  He was given sedation with Anesthesia.  Betadine prep and draped.  The whole distal finger was just attached by the flexor tendon ulnarly and nonviable.  This was at the level of the DIP with degloving of the hand from the volar aspect of the proximal phalanx onward.  There was some viable skin on the dorsal aspect of the proximal phalanx.  Amputated at the level of the PIP.  We used a rongeur to contour and slightly shorten the head of the PIP.  Copiously irrigated the wounds.  No coverage  available volarly.  The skin from the amputated finger was thinned down, removed of fat and fenestrated, cleaned.  Loosely tacked in place over the volar aspect of the proximal phalanx long finger for a full-thickness skin graft, 2 x 3 cm. Tacked down centrally as well.  Sterile dressings applied with Xeroform.  Abrasions of the hand cleaned and dressed with Xeroform also.  Awakened transferred stable to PACU.    POSTOPERATIVE:    1.  P.o. antibiotics.  2.  Elevate.  3.  Come for a wound check this Friday or next Monday.    Bert Crespo MD        D: 10/24/2022   T: 10/24/2022   MT: [id number]    Name:     MARTINEZ VEGA  MRN:      [id number]        Account:        [id number]   :      1980           Procedure Date: 10/24/2022     Document: [id number]

## 2022-10-24 NOTE — PROGRESS NOTES
RECEIVING UNIT ED HANDOFF REVIEW    ED Nurse Handoff Report was reviewed by: Merlene Kim RN on October 24, 2022 at 9:29 AM

## 2022-10-24 NOTE — ED NOTES
Report given to boarding RN.  Alexandria Lam RN,.......................................... 10/24/2022   7:21 AM

## 2022-10-24 NOTE — ANESTHESIA CARE TRANSFER NOTE
Patient: Mode Wolfe    Procedure: Procedure(s):  IRRIGATION AND DEBRIDEMENT, REVISION AMPUTATION LEFT 3RD FINGER       Diagnosis: Open fracture of finger [S62.609B]  Diagnosis Additional Information: No value filed.    Anesthesia Type:   Peripheral Nerve Block, MAC     Note:    Oropharynx: oropharynx clear of all foreign objects and spontaneously breathing  Level of Consciousness: awake  Oxygen Supplementation: room air    Independent Airway: airway patency satisfactory and stable  Dentition: dentition unchanged  Vital Signs Stable: post-procedure vital signs reviewed and stable  Report to RN Given: handoff report given  Patient transferred to: PACU  Comments: Transferred to PACU, spontaneous RR, on room air.  Monitors and alarms on and functioning, VSS, patient awake and comfortable.  Report to PACU RN  Handoff Report: Identifed the Patient, Identified the Reponsible Provider, Reviewed the pertinent medical history, Discussed the surgical course, Reviewed Intra-OP anesthesia mangement and issues during anesthesia, Set expectations for post-procedure period and Allowed opportunity for questions and acknowledgement of understanding      Vitals:  Vitals Value Taken Time   BP     Temp     Pulse     Resp     SpO2         Electronically Signed By: ELIZABETH Davis CRNA  October 24, 2022  4:35 PM

## 2022-10-24 NOTE — CONSULTS
[facility]    Orthopedic Consultation    Mode Wolfe MRN# [id number]   Age: 42 year old YOB: 1980     Date of Admission: 10/23/2022    Reason for consult: Degloving/fracture left long finger        Requesting provider: Josr Lau        Level of consult: Consult, follow and place orders           Assessment and Plan:   Assessment:   Degloving/fracture left long finger  Covid +       Plan:   Patient scheduled for a left long finger revision amputation later today.  Surgeon: Dr Crespo  NPO Status effective now   NWB left hand   Hold anticoagulants if taken: none PTA   Keep dressings in place.  Continue Abx            Chief Complaint:   Left long finger pain          History of Present Illness:   Via :   Mode Wolfe is a 42 year old male who presented to the emergency department last evening after he got his left third finger caught in a  in his garage.  He was helping his brother with a project.  Subsequently with degloving injury and near detachment of distal phalanx of left third finger.  In the emergency department, a digital block was provided and orthopedic surgery was contacted.  Recommendation was for Ancef, n.p.o. status, and partial amputation today.   Incidentally, patient found to have a positive COVID-19 test.              Past Medical History:   No past medical history on file.          Past Surgical History:   No past surgical history on file.          Social History:     Social History     Tobacco Use     Smoking status: Never     Smokeless tobacco: Not on file   Substance Use Topics     Alcohol use: No             Family History:   No family history on file.          Immunizations:     VACCINE/DOSE   Diptheria   DPT   DTAP   HBIG   Hepatitis A   Hepatitis B   HIB   Influenza   Measles   Meningococcal   MMR   Mumps   Pneumococcal   Polio   Rubella   Small Pox   TDAP   Varicella   Zoster             Allergies:   No Known  Allergies          Medications:     Current Facility-Administered Medications   Medication     acetaminophen (TYLENOL) tablet 650 mg    Or     acetaminophen (TYLENOL) Suppository 650 mg     bisacodyl (DULCOLAX) suppository 10 mg     ceFAZolin (ANCEF) 2 g in 100 mL D5W intermittent infusion     ceFAZolin (ANCEF) 2 g in 100 mL D5W intermittent infusion     ceFAZolin (ANCEF) 2 g in 100 mL D5W intermittent infusion     HYDROmorphone (PF) (DILAUDID) injection 0.5-1 mg     ibuprofen (ADVIL/MOTRIN) tablet 600 mg     lidocaine (LMX4) cream     lidocaine (LMX4) cream     lidocaine 1 % 0.1-1 mL     lidocaine 1 % 0.1-1 mL     Medication instructions: Do NOT use nebulized medications     melatonin tablet 1 mg     ondansetron (ZOFRAN ODT) ODT tab 4 mg    Or     ondansetron (ZOFRAN) injection 4 mg     oxyCODONE (ROXICODONE) tablet 5 mg     polyethylene glycol (MIRALAX) Packet 17 g     prochlorperazine (COMPAZINE) injection 10 mg    Or     prochlorperazine (COMPAZINE) tablet 10 mg    Or     prochlorperazine (COMPAZINE) suppository 25 mg     senna-docusate (SENOKOT-S/PERICOLACE) 8.6-50 MG per tablet 1 tablet    Or     senna-docusate (SENOKOT-S/PERICOLACE) 8.6-50 MG per tablet 2 tablet     sodium chloride (PF) 0.9% PF flush 3 mL     sodium chloride (PF) 0.9% PF flush 3 mL     sodium chloride (PF) 0.9% PF flush 3 mL     sodium chloride (PF) 0.9% PF flush 3 mL             Review of Systems:   ROS:  10 point ROS neg other than the symptoms noted above in the HPI.            Physical Exam:   All vitals have been reviewed  Patient Vitals for the past 24 hrs:   BP Temp Temp src Pulse Resp SpO2 Height Weight   10/24/22 0959 114/63 97.7  F (36.5  C) Axillary 65 16 93 % -- --   10/24/22 0700 113/76 -- -- 58 -- 95 % -- --   10/24/22 0600 105/76 -- -- 65 -- 96 % -- --   10/24/22 0300 -- -- -- -- -- 95 % -- --   10/24/22 0100 -- -- -- -- -- 95 % -- --   10/23/22 2230 116/77 -- -- 60 18 99 % -- --   10/23/22 2210 -- -- -- 65 17 -- -- --  "  10/23/22 2100 -- -- -- 61 18 96 % -- --   10/23/22 1945 -- -- -- 73 17 -- -- --   10/23/22 1930 110/81 -- -- 67 15 93 % -- --   10/23/22 1915 -- -- -- 64 26 97 % -- --   10/23/22 1900 (!) 111/90 -- -- 66 22 96 % -- --   10/23/22 1854 117/78 -- -- 67 14 96 % -- --   10/23/22 1800 (!) 181/158 96.9  F (36.1  C) Temporal 75 16 97 % 1.676 m (5' 6\") 68 kg (150 lb)     No intake or output data in the 24 hours ending 10/24/22 1218      Physical Exam   Temp: 97.7  F (36.5  C) Temp src: Axillary BP: 114/63 Pulse: 65   Resp: 16 SpO2: 93 % O2 Device: None (Room air)    Vital Signs with Ranges  Temp:  [96.9  F (36.1  C)-97.7  F (36.5  C)] 97.7  F (36.5  C)  Pulse:  [58-75] 65  Resp:  [14-26] 16  BP: (105-181)/() 114/63  SpO2:  [93 %-99 %] 93 %  150 lbs 0 oz    Constitutional: Pleasant, alert, appropriate, following commands.  HEENT: Head atraumatic normocephalic. Pupils equal round and reactive to light.  Respiratory: Unlabored breathing no audible wheeze  Cardiovascular: Regular rate and rhythm per pulses  GI: Abdomen non-distended.  Lymph/Hematologic: No lymphadenopathy in areas examined  Genitourinary:  No angel  Skin: No rashes, no cyanosis, no edema.  Musculoskeletal: Images of the left long finger reviewed demonstrating degloving of the long finger with distal fracture.  Mild drainage on dressings.  Sensation intact along distal remaining fingers.    Neurologic: normal without focal findings, mental status, speech normal, alert and oriented x iii  Neuropsychiatric: stable             Data:   All laboratory data reviewed  Results for orders placed or performed during the hospital encounter of 10/23/22   XR Hand Left G/E 3 Views     Status: None    Narrative    EXAM: XR HAND LEFT G/E 3 VIEWS  LOCATION: [facility]  DATE/TIME: 10/23/2022 7:51 PM    INDICATION: trauma to hand with 3rd finger degloving  COMPARISON: None.      Impression    IMPRESSION: Near amputation of the distal portion of the " middle finger, the distal phalanx is dislocated ulnarly and proximally and the soft tissues along the middle phalanx are displaced ulnarly along with the dislocated distal phalanx, exposing the   middle phalanx. There is a small bone fragment of the base of the distal phalanx which maintains articulation with the head of the middle phalanx. There is gas in the soft tissues near the base of the middle finger and the hand.   XR Chest 2 Views     Status: None    Narrative    EXAM: XR CHEST 2 VIEWS  LOCATION: [facility]  DATE/TIME: 10/23/2022 10:23 PM    INDICATION: covid positive, pre op clearance  COMPARISON: None.      Impression    IMPRESSION: Negative chest.   [place] Draw     Status: None    Narrative    The following orders were created for panel order [place] Draw.  Procedure                               Abnormality         Status                     ---------                               -----------         ------                     Extra Blue Top Tube[[id number]]                              Final result               Extra Green Top (Lithium...[[id number]]                      Final result               Extra Purple Top Tube[[id number]]                            Final result                 Please view results for these tests on the individual orders.   Asymptomatic COVID-19 Virus (Coronavirus) by PCR Nasopharyngeal     Status: Abnormal    Specimen: Nasopharyngeal; Swab   Result Value Ref Range    SARS CoV2 PCR Positive (A) Negative    Narrative    Testing was performed using the Xpert Xpress SARS-CoV-2 Assay on the   Cepheid Gene-Xpert Instrument Systems. Additional information about   this Emergency Use Authorization (EUA) assay can be found via the Lab   Guide. This test should be ordered for the detection of SARS-CoV-2 in   individuals who meet SARS-CoV-2 clinical and/or epidemiological   criteria. Test performance is unknown in asymptomatic patients. This   test is for in vitro  diagnostic use under the FDA EUA for   laboratories certified under CLIA to perform high complexity testing.   This test has not been FDA cleared or approved. A negative result   does not rule out the presence of PCR inhibitors in the specimen or   target RNA in concentration below the limit of detection for the   assay. The possibility of a false negative should be considered if   the patient's recent exposure or clinical presentation suggests   COVID-19. This test was validated by the [facility]. This laboratory is certified under the Clinical Laboratory Improvement Amendments of 1988 (CLIA-88) as qualified to perform high complexity laboratory testing.     Extra Blue Top Tube     Status: None   Result Value Ref Range    Hold Specimen JIC    Extra Green Top (Lithium Heparin) Tube     Status: None   Result Value Ref Range    Hold Specimen JIC    Extra Purple Top Tube     Status: None   Result Value Ref Range    Hold Specimen JIC    Comprehensive metabolic panel     Status: Abnormal   Result Value Ref Range    Sodium 142 133 - 144 mmol/L    Potassium 3.7 3.4 - 5.3 mmol/L    Chloride 110 (H) 94 - 109 mmol/L    Carbon Dioxide (CO2) 28 20 - 32 mmol/L    Anion Gap 4 3 - 14 mmol/L    Urea Nitrogen 25 7 - 30 mg/dL    Creatinine 1.22 0.66 - 1.25 mg/dL    Calcium 9.0 8.5 - 10.1 mg/dL    Glucose 111 (H) 70 - 99 mg/dL    Alkaline Phosphatase 66 40 - 150 U/L    AST 21 0 - 45 U/L    ALT 40 0 - 70 U/L    Protein Total 7.7 6.8 - 8.8 g/dL    Albumin 4.1 3.4 - 5.0 g/dL    Bilirubin Total 0.3 0.2 - 1.3 mg/dL    GFR Estimate 75 >60 mL/min/1.73m2   INR     Status: Normal   Result Value Ref Range    INR 1.06 0.85 - 1.15   CRP inflammation     Status: Normal   Result Value Ref Range    CRP Inflammation <2.9 0.0 - 8.0 mg/L   CBC with platelets and differential     Status: Abnormal   Result Value Ref Range    WBC Count 12.8 (H) 4.0 - 11.0 10e3/uL    RBC Count 4.95 4.40 - 5.90 10e6/uL    Hemoglobin 14.5  13.3 - 17.7 g/dL    Hematocrit 43.0 40.0 - 53.0 %    MCV 87 78 - 100 fL    MCH 29.3 26.5 - 33.0 pg    MCHC 33.7 31.5 - 36.5 g/dL    RDW 12.7 10.0 - 15.0 %    Platelet Count 147 (L) 150 - 450 10e3/uL   Manual Differential     Status: None   Result Value Ref Range    % Neutrophils 61 %    % Lymphocytes 31 %    % Monocytes 7 %    % Eosinophils 1 %    % Basophils 0 %    Absolute Neutrophils 7.8 1.6 - 8.3 10e3/uL    Absolute Lymphocytes 4.0 0.8 - 5.3 10e3/uL    Absolute Monocytes 0.9 0.0 - 1.3 10e3/uL    Absolute Eosinophils 0.1 0.0 - 0.7 10e3/uL    Absolute Basophils 0.0 0.0 - 0.2 10e3/uL    RBC Morphology Confirmed RBC Indices     Platelet Assessment  Automated Count Confirmed. Platelet morphology is normal.     Automated Count Confirmed. Platelet morphology is normal.   D dimer quantitative     Status: Normal   Result Value Ref Range    D-Dimer Quantitative <0.27 0.00 - 0.50 ug/mL FEU    Narrative    This D-dimer assay is intended for use in conjunction with a clinical pretest probability assessment model to exclude pulmonary embolism (PE) and deep venous thrombosis (DVT) in outpatients suspected of PE or DVT. The cut-off value is 0.50 ug/mL FEU.   CBC with Platelets & Differential     Status: Abnormal    Narrative    The following orders were created for panel order CBC with Platelets & Differential.  Procedure                               Abnormality         Status                     ---------                               -----------         ------                     CBC with platelets and d...[[id number]]  Abnormal            Final result               Manual Differential[[id number]]                              Final result                 Please view results for these tests on the individual orders.          Attestation:  I have reviewed today's vital signs, notes, medications, labs and imaging with Dr. Crespo.  Amount of time performed on this consult: 45 minutes.    Coty Ivan PA-C

## 2022-10-24 NOTE — BRIEF OP NOTE
[facility]    Brief Operative Note    Pre-operative diagnosis: Open fracture of finger [S62.609B]  Post-operative diagnosis Same as pre-operative diagnosis    Procedure: Procedure(s):  IRRIGATION AND DEBRIDEMENT, REVISION AMPUTATION LEFT 3RD FINGER with full thickness skin graft   Surgeon: Surgeon(s) and Role:     * Bert Crespo MD - Primary     * Henrique Jackson PA-C - Assisting  Anesthesia: General   Estimated Blood Loss: Less than 10 ml    Drains: None  Specimens: * No specimens in log *  Findings:   None.  Complications: None.  Implants: * No implants in log *      Dressing in place until Friday follow up clinic my office [facility]  [facility]    Monday 1-5 PM  and Thursday 7:30-12:00 AM  [address] [place] [address]  [phone]    [phone]    Or    Tuesday 7:30-5 PM  [address] [facility] [address]  [place], [place]

## 2022-10-25 LAB
FASTING STATUS PATIENT QL REPORTED: ABNORMAL
GLUCOSE BLD-MCNC: 155 MG/DL (ref 70–99)
HGB BLD-MCNC: 14.3 G/DL (ref 13.3–17.7)

## 2022-10-25 PROCEDURE — 250N000011 HC RX IP 250 OP 636: Performed by: PHYSICIAN ASSISTANT

## 2022-10-25 PROCEDURE — 250N000013 HC RX MED GY IP 250 OP 250 PS 637: Performed by: INTERNAL MEDICINE

## 2022-10-25 PROCEDURE — 250N000011 HC RX IP 250 OP 636: Performed by: INTERNAL MEDICINE

## 2022-10-25 PROCEDURE — 250N000013 HC RX MED GY IP 250 OP 250 PS 637: Performed by: PHYSICIAN ASSISTANT

## 2022-10-25 PROCEDURE — 36415 COLL VENOUS BLD VENIPUNCTURE: CPT | Performed by: FAMILY MEDICINE

## 2022-10-25 PROCEDURE — 82947 ASSAY GLUCOSE BLOOD QUANT: CPT | Performed by: FAMILY MEDICINE

## 2022-10-25 PROCEDURE — 99232 SBSQ HOSP IP/OBS MODERATE 35: CPT | Performed by: INTERNAL MEDICINE

## 2022-10-25 PROCEDURE — 999N000111 HC STATISTIC OT IP EVAL DEFER: Performed by: OCCUPATIONAL THERAPIST

## 2022-10-25 PROCEDURE — 85018 HEMOGLOBIN: CPT | Performed by: PHYSICIAN ASSISTANT

## 2022-10-25 PROCEDURE — 120N000001 HC R&B MED SURG/OB

## 2022-10-25 PROCEDURE — 96372 THER/PROPH/DIAG INJ SC/IM: CPT | Performed by: INTERNAL MEDICINE

## 2022-10-25 RX ORDER — CEPHALEXIN 500 MG/1
500 CAPSULE ORAL EVERY 12 HOURS
Qty: 28 CAPSULE | Refills: 0 | Status: SHIPPED | OUTPATIENT
Start: 2022-10-25 | End: 2022-11-08

## 2022-10-25 RX ORDER — HYDROXYZINE HYDROCHLORIDE 25 MG/1
25 TABLET, FILM COATED ORAL EVERY 6 HOURS PRN
Status: DISCONTINUED | OUTPATIENT
Start: 2022-10-25 | End: 2022-10-26 | Stop reason: HOSPADM

## 2022-10-25 RX ORDER — POLYETHYLENE GLYCOL 3350 17 G/17G
17 POWDER, FOR SOLUTION ORAL DAILY PRN
Qty: 510 G | Refills: 0 | Status: SHIPPED | OUTPATIENT
Start: 2022-10-25

## 2022-10-25 RX ORDER — OXYCODONE HYDROCHLORIDE 5 MG/1
5-10 TABLET ORAL EVERY 4 HOURS PRN
Qty: 20 TABLET | Refills: 0 | Status: SHIPPED | OUTPATIENT
Start: 2022-10-25

## 2022-10-25 RX ORDER — OXYCODONE HYDROCHLORIDE 5 MG/1
5-10 TABLET ORAL EVERY 4 HOURS PRN
Status: DISCONTINUED | OUTPATIENT
Start: 2022-10-25 | End: 2022-10-26 | Stop reason: HOSPADM

## 2022-10-25 RX ORDER — IBUPROFEN 600 MG/1
600 TABLET, FILM COATED ORAL EVERY 6 HOURS PRN
Qty: 45 TABLET | Refills: 0 | Status: SHIPPED | OUTPATIENT
Start: 2022-10-25

## 2022-10-25 RX ORDER — ENOXAPARIN SODIUM 100 MG/ML
40 INJECTION SUBCUTANEOUS EVERY 24 HOURS
Status: DISCONTINUED | OUTPATIENT
Start: 2022-10-25 | End: 2022-10-26 | Stop reason: HOSPADM

## 2022-10-25 RX ORDER — HYDROXYZINE HYDROCHLORIDE 25 MG/1
50 TABLET, FILM COATED ORAL EVERY 6 HOURS PRN
Status: DISCONTINUED | OUTPATIENT
Start: 2022-10-25 | End: 2022-10-26 | Stop reason: HOSPADM

## 2022-10-25 RX ORDER — HYDROXYZINE HYDROCHLORIDE 25 MG/1
25 TABLET, FILM COATED ORAL EVERY 6 HOURS PRN
Qty: 30 TABLET | Refills: 0 | Status: SHIPPED | OUTPATIENT
Start: 2022-10-25

## 2022-10-25 RX ADMIN — HYDROMORPHONE HYDROCHLORIDE 1 MG: 1 INJECTION, SOLUTION INTRAMUSCULAR; INTRAVENOUS; SUBCUTANEOUS at 17:30

## 2022-10-25 RX ADMIN — ENOXAPARIN SODIUM 40 MG: 40 INJECTION SUBCUTANEOUS at 14:28

## 2022-10-25 RX ADMIN — OXYCODONE HYDROCHLORIDE 5 MG: 5 TABLET ORAL at 02:33

## 2022-10-25 RX ADMIN — CEFAZOLIN 1 G: 1 INJECTION, POWDER, FOR SOLUTION INTRAMUSCULAR; INTRAVENOUS at 05:42

## 2022-10-25 RX ADMIN — HYDROMORPHONE HYDROCHLORIDE 1 MG: 1 INJECTION, SOLUTION INTRAMUSCULAR; INTRAVENOUS; SUBCUTANEOUS at 09:13

## 2022-10-25 RX ADMIN — CEPHALEXIN 500 MG: 500 CAPSULE ORAL at 09:13

## 2022-10-25 RX ADMIN — OXYCODONE HYDROCHLORIDE 5 MG: 5 TABLET ORAL at 06:44

## 2022-10-25 RX ADMIN — OXYCODONE HYDROCHLORIDE 10 MG: 5 TABLET ORAL at 14:28

## 2022-10-25 RX ADMIN — CEPHALEXIN 500 MG: 500 CAPSULE ORAL at 19:53

## 2022-10-25 ASSESSMENT — ACTIVITIES OF DAILY LIVING (ADL)
ADLS_ACUITY_SCORE: 35

## 2022-10-25 NOTE — PROGRESS NOTES
Pt is A&O x4. VSS.Left finger incision site  ace wrap is CDI. Pt has intermittent numbness and tingling of the L fingers. CMS intact. Pt is voiding and drinking fluids well. IV saline lock patent. Pt stood at bedside and ambulated a few steps. Pain managed with Oxycodone and Hydromorphone. NWB to L finger. Remains on Covid precautions. Pt denies coughing/difficulty breathing or weakness.

## 2022-10-25 NOTE — ANESTHESIA POSTPROCEDURE EVALUATION
Patient: Mode Wolfe    Procedure: Procedure(s):  IRRIGATION AND DEBRIDEMENT, REVISION AMPUTATION LEFT 3RD FINGER       Anesthesia Type:  MAC    Note:     Postop Pain Control: Uneventful            Sign Out: Well controlled pain   PONV: No   Neuro/Psych: Uneventful            Sign Out: Acceptable/Baseline neuro status   Airway/Respiratory: Uneventful            Sign Out: Acceptable/Baseline resp. status   CV/Hemodynamics: Uneventful            Sign Out: Acceptable CV status; No obvious hypovolemia; No obvious fluid overload   Other NRE:    DID A NON-ROUTINE EVENT OCCUR? No    Event details/Postop Comments:  Planned anesthetic was MAC with digital block by surgeon.  Patient tolerated procedure/anesthetic well until closure, when patient vomited.  Head turned to right, suction applied, lung sounds clear postoperatively.  Stable SpO2.  No suspicion for aspiration.            Last vitals:  Vitals:    10/24/22 1700 10/24/22 1730 10/24/22 1830   BP: 138/75 104/76 123/76   Pulse: 96  70   Resp: 16 18 18   Temp:      SpO2: 96% 95% 94%       Electronically Signed By: Genaro Luong MD  October 24, 2022  7:46 PM

## 2022-10-25 NOTE — UTILIZATION REVIEW
Admission Status; Secondary Review Determination    Under the authority of the Utilization Management Committee, the utilization review process indicated a secondary review on the above patient. The review outcome is based on review of the medical records, discussions with staff, and applying clinical experience noted on the date of the review.    (x) Inpatient Status Appropriate - This patient's medical care is consistent with medical management for inpatient care and reasonable inpatient medical practice.    RATIONALE FOR DETERMINATION: 42-year-old male who had a left long finger degloving injury and near amputation of the distal phalanx after getting finger caught in a cement mixing machine in his garage.  Patient with severe post injury pain requiring IV antibiotics, IV fluids and underwent revision amputation, left long finger, to the level of PIP joint. Full-thickness skin graft volar aspect of the long finger, 3 x 2 cm. on 10/24/22.  The severity of patient's injury and the significant difficulty with perioperative pain control, patient will require multiple nights in the hospital appropriate for inpatient management.    At the time of admission with the information available to the attending physician more than 2 nights Hospital complex care was anticipated, based on patient risk of adverse outcome if treated as outpatient and complex care required. Inpatient admission is appropriate based on the Medicare guidelines.    This document was produced using voice recognition software    The information on this document is developed by the utilization review team in order for the business office to ensure compliance. This only denotes the appropriateness of proper admission status and does not reflect the quality of care rendered.    The definitions of Inpatient Status and Observation Status used in making the determination above are those provided in the CMS Coverage Manual, Chapter 1 and Chapter 6, section  70.4.    Sincerely,    James Eid MD  Utilization Review  Physician Advisor  [facility].

## 2022-10-25 NOTE — PLAN OF CARE
OT: orders received and chart reviewed and met with pt. Educated on OT role. Pt was ind at baseline, no biggest limiting factor is pain. Pt does not feel he has any difficulty with dressing, bathing or bathroom transfers. Pt has eaten breakfast this morning without difficulty. Educated to ice and elevate hand. Pt very receptive to info and thankful for care. No acute OT needs. Pt plans to discharge to [facility] for a few days. Will complete OT orders

## 2022-10-25 NOTE — PROGRESS NOTES
[facility]    Medicine Progress Note - Hospitalist Service    Date of Admission:  10/23/2022    Assessment & Plan   Mode Wolfe is a 42 year old male admitted on 10/23/2022. He presents to the emergency department after a degloving injury to his left third finger while working with a  in his garage.     Degloving injury of left third finger   S/p revision amputation, left long finger to the level of PIP joint, full thickness skin graft volar aspect of long finger on 10/24  - routine post op care per orthopedics  - on Keflex 500mg BID      Confirmed COVID-19 infection    - Asymptomatic. Tested positive on 10/23  - vaccinated x 2  - CXR no infiltrate. CRP <2.9, D-dimer <0.27  - no specific covid directed treatment at this time, he is asymptomatic, not hypoxic and no infiltrate on xray  - continue special precautions  - not on dvt prophy at this time given anticipated surgery    Leukocytosis  Wbc 12.4. this is likely stress response to above injury. CXR without infiltrate.COVID+,but asymptomatic    Hypothyroidism:   Most recent TSH well controlled in July 2022 through his primary care system.  -Resume prior to admission levothyroxine 137 mcg daily at discharge.  Held given observation admission.          Diet: Advance Diet as Tolerated: Regular Diet Adult    DVT Prophylaxis: Ambulate every shift  Mann Catheter: Not present  Central Lines: None  Cardiac Monitoring: None  Code Status: Full Code      Disposition Plan  anticipate discharge today if cleared by ortho.      Expected Discharge Date: 10/25/2022                The patient's care was discussed with the Bedside Nurse and Patient.    Elizabeth Kebede MD  Hospitalist Service  [facility]  Securely message with the Vocera Web Console (learn more here)  Text page via "[employer] Paging/Directory         Clinically Significant Risk Factors Present on Admission                      # Overweight:  "Estimated body mass index is 29.64 kg/m  as calculated from the following:    Height as of this encounter: 1.676 m (5' 6\").    Weight as of this encounter: 83.3 kg (183 lb 10.3 oz).           ______________________________________________________________________    Interval History   Reports pain at surgical site. States once pain medication wears out, he gets throbbing pain. He is worried about going home because of pain.   Otherwise denies cp, sob, nausea, vomiting or abdominal pain.    Data reviewed today: I reviewed all medications, new labs and imaging results over the last 24 hours. I personally reviewed no images or EKG's today.    Physical Exam   Vital Signs: Temp: 99  F (37.2  C) Temp src: Oral BP: 112/71 Pulse: 62   Resp: 16 SpO2: 95 % O2 Device: None (Room air)    Weight: 183 lbs 10.29 oz  General Appearance: Alert, awake and no apparent distress  Respiratory: CTAB, no wheezing  Cardiovascular: regular rate and rhythm  GI: soft and non-tender  Skin: warm and dry  Other: Left hand in dressing    Data   Recent Labs   Lab 10/25/22  0828 10/23/22  1844   WBC  --  12.8*   HGB 14.3 14.5   MCV  --  87   PLT  --  147*   INR  --  1.06   NA  --  142   POTASSIUM  --  3.7   CHLORIDE  --  110*   CO2  --  28   BUN  --  25   CR  --  1.22   ANIONGAP  --  4   ELIEZER  --  9.0   * 111*   ALBUMIN  --  4.1   PROTTOTAL  --  7.7   BILITOTAL  --  0.3   ALKPHOS  --  66   ALT  --  40   AST  --  21     No results found for this or any previous visit (from the past 24 hour(s)).  Medications     - MEDICATION INSTRUCTIONS -         cephALEXin  500 mg Oral Q12H SHY (08/20)     sodium chloride (PF)  3 mL Intracatheter Q8H     sodium chloride (PF)  3 mL Intracatheter Q8H     "

## 2022-10-25 NOTE — PROGRESS NOTES
Orthopedic Surgery  Mode Wolfe  10/25/2022     Admit Date:  10/23/2022  POD: 1 Day Post-Op   Procedure(s):  IRRIGATION AND DEBRIDEMENT, REVISION AMPUTATION LEFT 3RD FINGER    Alert and oriented.   Patient resting comfortably in bed.    Pain poorly controlled.  Reporting a shooting pain into his forearm.    Tolerating oral intake.    Denies nausea or vomiting  Denies chest pain or shortness of breath.    Temp:  [97.4  F (36.3  C)-99.6  F (37.6  C)] 99  F (37.2  C)  Pulse:  [57-96] 62  Resp:  [16-20] 16  BP: (104-138)/(58-77) 112/71  SpO2:  [94 %-97 %] 95 %    Dressings are clean, dry and intact.   Able to flex and extend remaining fingers without difficulty.   TTP along the left forearm musculature, compressible compartments.  No erythema.    Full sensation to light touch.   Radial pulse present.   Capillary refill <2 seconds.     Labs:  Recent Labs   Lab Test 10/25/22  0828 10/23/22  1844   WBC  --  12.8*   HGB 14.3 14.5   PLT  --  147*     Recent Labs   Lab Test 10/23/22  1844   INR 1.06     Recent Labs   Lab Test 10/23/22  1844   CRP <2.9     1. PLAN:   Ambulation for DVT prophylaxis.     Avoid pressure left hand.     Increased oxycodone and added atarax.    Dressings: Keep intact.     Follow-up: Friday or Monday with Dr Crespo     2. Disposition   Anticipate d/c to home tomorrow if pain controlled.      Coty Ivan PA-C

## 2022-10-25 NOTE — PROGRESS NOTES
Pt came back from surgery at ~1630. VSS.RA. Pain managed with Tyl and Oxy. Uses urinal. Adequate I&O. L finger incision site dry and intact. NWB to L finger. IV infusing. Covid precaution.

## 2022-10-26 VITALS
BODY MASS INDEX: 30.12 KG/M2 | TEMPERATURE: 98.9 F | SYSTOLIC BLOOD PRESSURE: 115 MMHG | WEIGHT: 187.39 LBS | HEART RATE: 56 BPM | OXYGEN SATURATION: 96 % | HEIGHT: 66 IN | DIASTOLIC BLOOD PRESSURE: 71 MMHG | RESPIRATION RATE: 16 BRPM

## 2022-10-26 LAB
GLUCOSE BLD-MCNC: 107 MG/DL (ref 70–99)
HGB BLD-MCNC: 14.7 G/DL (ref 13.3–17.7)
PATH REPORT.COMMENTS IMP SPEC: NORMAL
PATH REPORT.COMMENTS IMP SPEC: NORMAL
PATH REPORT.FINAL DX SPEC: NORMAL
PATH REPORT.GROSS SPEC: NORMAL
PATH REPORT.MICROSCOPIC SPEC OTHER STN: NORMAL
PATH REPORT.RELEVANT HX SPEC: NORMAL
PHOTO IMAGE: NORMAL

## 2022-10-26 PROCEDURE — 82947 ASSAY GLUCOSE BLOOD QUANT: CPT | Performed by: INTERNAL MEDICINE

## 2022-10-26 PROCEDURE — 99238 HOSP IP/OBS DSCHRG MGMT 30/<: CPT | Performed by: INTERNAL MEDICINE

## 2022-10-26 PROCEDURE — 250N000013 HC RX MED GY IP 250 OP 250 PS 637: Performed by: PHYSICIAN ASSISTANT

## 2022-10-26 PROCEDURE — 250N000011 HC RX IP 250 OP 636: Performed by: INTERNAL MEDICINE

## 2022-10-26 PROCEDURE — 85018 HEMOGLOBIN: CPT | Performed by: PHYSICIAN ASSISTANT

## 2022-10-26 PROCEDURE — 36415 COLL VENOUS BLD VENIPUNCTURE: CPT | Performed by: INTERNAL MEDICINE

## 2022-10-26 RX ADMIN — CEPHALEXIN 500 MG: 500 CAPSULE ORAL at 09:07

## 2022-10-26 RX ADMIN — HYDROXYZINE HYDROCHLORIDE 25 MG: 25 TABLET, FILM COATED ORAL at 09:07

## 2022-10-26 RX ADMIN — OXYCODONE HYDROCHLORIDE 10 MG: 5 TABLET ORAL at 16:13

## 2022-10-26 RX ADMIN — ENOXAPARIN SODIUM 40 MG: 40 INJECTION SUBCUTANEOUS at 14:36

## 2022-10-26 RX ADMIN — OXYCODONE HYDROCHLORIDE 10 MG: 5 TABLET ORAL at 12:59

## 2022-10-26 RX ADMIN — OXYCODONE HYDROCHLORIDE 10 MG: 5 TABLET ORAL at 09:06

## 2022-10-26 RX ADMIN — HYDROXYZINE HYDROCHLORIDE 50 MG: 25 TABLET, FILM COATED ORAL at 16:38

## 2022-10-26 ASSESSMENT — ACTIVITIES OF DAILY LIVING (ADL)
ADLS_ACUITY_SCORE: 35

## 2022-10-26 NOTE — DISCHARGE SUMMARY
[facility]  Hospitalist Discharge Summary      Date of Admission:  10/23/2022  Date of Discharge:  10/26/2022  Discharging Provider: Elizabeth Kebede MD  Discharge Service: Hospitalist Service    Discharge Diagnoses   See below    Follow-ups Needed After Discharge   Follow-up Appointments     Follow-up and recommended labs and tests       Follow up with Dr. Crespo , at [facility], on 10/28  to evaluate after   surgery. No follow up labs or test are needed.  Call for appointment: [phone]  After hours: [phone]  Fax: [phone] or [phone]             Unresulted Labs Ordered in the Past 30 Days of this Admission     Date and Time Order Name Status Description    10/24/2022  4:13 PM Surgical Pathology Exam In process       These results will be followed up by orthopedics    Discharge Disposition   Discharged to home  Condition at discharge: Stable  Patient ready to discharge to a skilled nursing facility as soon as possible in order to create capacity for patients related to the COVID-19 pandemic.    Hospital Course   Mode Wolfe is a 42 year old male admitted on 10/23/2022. He presents to the emergency department after a degloving injury to his left third finger while working with a  in his garage. Hospital course as below.     Degloving injury of left third finger   S/p revision amputation, left long finger to the level of PIP joint, full thickness skin graft volar aspect of long finger on 10/24  Pain is currently controlled. Managed by orthopedics postoperatively.  He will discharge on Keflex 500mg BID for prophylaxis.   - follow up with Orthopedics on 10/28 or 10/31    Confirmed COVID-19 infection    - Asymptomatic. Tested positive on 10/23  - vaccinated x 2  - CXR no infiltrate. CRP <2.9, D-dimer <0.27  - no specific covid directed treatment at this time, he is asymptomatic, not hypoxic and no infiltrate on xray  - received dvt prophy on pod#1 since  remained in hospital  - counseled on isolation precautions    Leukocytosis  Wbc 12.4. this is likely stress response to above injury. CXR without infiltrate.COVID+,but asymptomatic    Hypothyroidism:   Most recent TSH well controlled in July 2022 through his primary care system.  -Resume prior to admission levothyroxine 137 mcg daily          Consultations This Hospital Stay   ORTHOPEDIC SURGERY IP CONSULT  OCCUPATIONAL THERAPY ADULT IP CONSULT  PHYSICAL THERAPY ADULT IP CONSULT    Code Status   Full Code    Time Spent on this Encounter   I, Elizabeth Kebede MD, personally saw the patient today and spent 25 minutes discharging this patient.       Elizabeth Kebede MD  [facility] ORTHOPEDICS SPINE  [address] [facility] [address]  Phone: [phone]  Fax: [phone]  ______________________________________________________________________    Physical Exam   Vital Signs: Temp: 99.1  F (37.3  C) Temp src: Oral BP: 136/86 Pulse: 58   Resp: 18 SpO2: 97 % O2 Device: None (Room air)    Weight: 187 lbs 6.26 oz  General Appearance: Alert,awake and no apparent distress  Respiratory: clear to auscultation bilaterally  Cardiovascular: no wheezing or rhonchi  GI: soft and non-tender  Skin: warm and dry  MSK: left hand in dressing       Primary Care Physician   Physician No Ref-Primary    Discharge Orders      Reason for your hospital stay    Left 3rd finger degloving injury with revision amputation     Follow-up and recommended labs and tests     Follow up with Dr. Crespo , at [facility], on 10/28  to evaluate after surgery. No follow up labs or test are needed.  Call for appointment: [phone]  After hours: [phone]  Fax: [phone] or [phone]     Activity    Your activity upon discharge: activity as tolerated and no driving while on analgesics     Wound care and dressings    Instructions to care for your wound at home: ice to area for comfort, reinforce dressing as needed, and  none needed.     Discharge Instructions    You have tested positive for COVID19 on 10/23/22. Recommend isolation for 10 days.  If your remain asymptomatic, you are able to go out, but need to wear mask from day 6-10.     Diet    Follow this diet upon discharge: Orders Placed This Encounter      Advance Diet as Tolerated: Regular Diet Adult       Significant Results and Procedures   Most Recent 3 CBC's:Recent Labs   Lab Test 10/26/22  0835 10/25/22  0828 10/23/22  1844   WBC  --   --  12.8*   HGB 14.7 14.3 14.5   MCV  --   --  87   PLT  --   --  147*     Most Recent 3 BMP's:Recent Labs   Lab Test 10/26/22  0835 10/25/22  0828 10/23/22  1844   NA  --   --  142   POTASSIUM  --   --  3.7   CHLORIDE  --   --  110*   CO2  --   --  28   BUN  --   --  25   CR  --   --  1.22   ANIONGAP  --   --  4   ELIEZER  --   --  9.0   * 155* 111*   ,   Results for orders placed or performed during the hospital encounter of 10/23/22   XR Hand Left G/E 3 Views    Narrative    EXAM: XR HAND LEFT G/E 3 VIEWS  LOCATION: [facility]  DATE/TIME: 10/23/2022 7:51 PM    INDICATION: trauma to hand with 3rd finger degloving  COMPARISON: None.      Impression    IMPRESSION: Near amputation of the distal portion of the middle finger, the distal phalanx is dislocated ulnarly and proximally and the soft tissues along the middle phalanx are displaced ulnarly along with the dislocated distal phalanx, exposing the   middle phalanx. There is a small bone fragment of the base of the distal phalanx which maintains articulation with the head of the middle phalanx. There is gas in the soft tissues near the base of the middle finger and the hand.   XR Chest 2 Views    Narrative    EXAM: XR CHEST 2 VIEWS  LOCATION: [facility]  DATE/TIME: 10/23/2022 10:23 PM    INDICATION: covid positive, pre op clearance  COMPARISON: None.      Impression    IMPRESSION: Negative chest.       Discharge Medications   Current  Discharge Medication List      START taking these medications    Details   cephALEXin (KEFLEX) 500 MG capsule Take 1 capsule (500 mg) by mouth every 12 hours for 14 days  Qty: 28 capsule, Refills: 0    Associated Diagnoses: Degloving injury of finger, initial encounter      hydrOXYzine (ATARAX) 25 MG tablet Take 1 tablet (25 mg) by mouth every 6 hours as needed for other (adjuvant pain)  Qty: 30 tablet, Refills: 0    Associated Diagnoses: Degloving injury of finger, initial encounter      ibuprofen (ADVIL/MOTRIN) 600 MG tablet Take 1 tablet (600 mg) by mouth every 6 hours as needed for inflammatory pain  Qty: 45 tablet, Refills: 0    Associated Diagnoses: Degloving injury of finger, initial encounter      oxyCODONE (ROXICODONE) 5 MG tablet Take 1-2 tablets (5-10 mg) by mouth every 4 hours as needed for moderate to severe pain (one tab for moderate pain (4-6 pain scale), two tabs for severe pain (7-10 pain scale))  Qty: 20 tablet, Refills: 0    Associated Diagnoses: Degloving injury of finger, initial encounter      polyethylene glycol (MIRALAX) 17 GM/Dose powder Take 17 g by mouth daily as needed for constipation  Qty: 510 g, Refills: 0    Associated Diagnoses: Degloving injury of finger, initial encounter         CONTINUE these medications which have NOT CHANGED    Details   acetaminophen (TYLENOL) 500 MG tablet Take 500-1,000 mg by mouth every 6 hours as needed for mild pain      levothyroxine (SYNTHROID/LEVOTHROID) 137 MCG tablet Take 137 mcg by mouth daily           Allergies   No Known Allergies

## 2022-10-26 NOTE — PROGRESS NOTES
Patient vital signs are at baseline: Yes  Patient able to ambulate as they were prior to admission or with assist devices provided by therapies during their stay:  Yes  Patient MUST void prior to discharge:  Yes  Patient able to tolerate oral intake:  Yes  Pain has adequate pain control using Oral analgesics:  Yes  Does patient have an identified :  Yes  Has goal D/C date and time been discussed with patient:  Yes    Pt A&Ox4; independent in room. Pain well controlled. Dressing to L hand CDI. Pt continues to be on COVID precautions. Slept most of this shift. Possible discharge to home today.

## 2022-10-26 NOTE — DISCHARGE SUMMARY
Patient vital signs are at baseline: Yes  Patient able to ambulate as they were prior to admission or with assist devices provided by therapies during their stay:  Yes  Patient MUST void prior to discharge:  Yes  Patient able to tolerate oral intake:  Yes  Pain has adequate pain control using Oral analgesics:  Yes  Does patient have an identified :  Yes  Has goal D/C date and time been discussed with patient:  Yes     Patient discharging home via family, A&Ox4. VSS. CMS intact. L hand dressing DCI and ace wrapped in placed. Pain manage with PRN Oxycodone, Atarax, pain was well managed prior to discharge. AVS printed and instructions explained to Pt with acknowledgment of informations and instructions been clear. Medication given to Pt. Belonging returned to Pt. Patient still on COVID precaution.

## 2022-10-26 NOTE — PROGRESS NOTES
Patient vital signs are at baseline: Yes  Patient able to ambulate as they were prior to admission or with assist devices provided by therapies during their stay:  Yes  Patient MUST void prior to discharge:  Yes  Patient able to tolerate oral intake:  Yes  Pain has adequate pain control using Oral analgesics:  no  Does patient have an identified :  Yes  Has goal D/C date and time been discussed with patient:  Yes    Pt A&O x4, up indep, VSS.RA. Pain managed with IV Dilaudid and Oxy. Tinging on L fingers. Dressing to incision site dry and intact. Covid precaution. Denied SOB or coughing. Possible discharge home tomorrow.

## 2022-10-26 NOTE — PROGRESS NOTES
"Orthopedics    Patient pain improved    /86   Pulse 58   Temp 99.1  F (37.3  C) (Oral)   Resp 18   Ht 1.676 m (5' 6\")   Wt 85 kg (187 lb 6.3 oz)   SpO2 97%   BMI 30.25 kg/m      Dressings intact and clean     1. PLAN:                              Avoid pressure left hand.                Continue pain regimen.               Dressings: Keep intact.                Follow-up: Friday or Monday with Dr Crespo      2. Disposition              Anticipate d/c to home today. Ortho stable     "

## 2022-11-26 NOTE — H&P
No new care gaps identified.  [facility] Embedded Care Gaps. Reference number: [id number]. 11/26/2022   10:07:26 AM CST   [facility]    History and Physical - Hospitalist Service       Date of Admission:  10/23/2022    Assessment & Plan      Mode Wolfe is a 42 year old male admitted on 10/23/2022. He presents to the emergency department after a degloving injury to his left third finger while working with a  in his garage.    Degloving injury of left third finger:  -Ancef 2 g every 8 hours  -Orthopedic surgery consulted, aware of patient from emergency department, plan in place for partial digit amputation in a.m.  -N.p.o. after midnight  -Acetaminophen, oxycodone, IV Dilaudid available as needed for pain control.  Patient did receive a digital block in the emergency department initially, if he boards in the emergency department overnight, this could potentially be repeated and might be more beneficial than oral and IV pain medications.  -As needed bowel regimen available as needed.  Discussed with patient on admission    # Confirmed COVID-19 infection       Symptom Onset Unknown, no symptoms   Date of 1st Positive Test 10/23/2022   Vaccination Status Fully Vaccinated; reports two COVID shots       - COVID-19 special precautions, continuous pulse-ox  - Oxygen: Not currently requiring oxygen therapy.  If needed, titrate to keep SpO2 between 90-96%  - Labs: Standard COVID admission labs ordered (CBC with diff, CMP, INR, D-dimer, CRP).   - Imaging: chest x-ray ordered  - Breathing treatments: no inhalers needed; avoid nebulizers in favor of MDIs   - Antibiotics: Ancef 2 g every 8 hours for perioperative prophylaxis following degloving injury  - COVID-Focused Medications: No COVID-specific therapies are appropriate at this time.;  - DVT Prophylaxis:         - At high risk of thrombotic complications due to COVID-19 (DDimer = <0.27 ug/mL FEU (Ref range: 0.00 - 0.50 ug/mL FEU) )         - Pharmacologic DVT prophylaxis contraindicated due to Pending surgery    Hypothyroidism: Most recent TSH well  controlled in July 2022 through his primary care system.  -Resume prior to admission levothyroxine 137 mcg daily at discharge.  Held given observation admission.       Diet: NPO per Anesthesia Guidelines for Procedure/Surgery Except for: Meds    DVT Prophylaxis: Pneumatic Compression Devices further anticoagulation per orthopedics team  Mann Catheter: Not present  Central Lines: None  Cardiac Monitoring: None  Code Status:  Full code    Clinically Significant Risk Factors Present on Admission                              Disposition Plan      Anticipate 10/24/2022 afternoon/evening following finger amputation    The patient's care was discussed with the Patient and Dr Boyd in the emergency department.  Discussed also with patient's brother at bedside    Josr Lau MD  Hospitalist Service  [facility]  Securely message with the Vocera Web Console (learn more here)  Text page via [employer] Paging/Directory         ______________________________________________________________________    Chief Complaint   Left third finger pain after degloving injury    History is obtained from patient, chart review, patient's mother at bedside, discussion with Dr. Boyd in the emergency department.  Also reviewed outside records with prior hypothyroidism corrected after initiation on levothyroxine    History of Present Illness   Mode Wolfe is a 42 year old male who presented to the emergency department after he got his left third finger caught in a  in his garage.  Subsequently with degloving injury and near detachment of distal phalanx of left third finger.    In the emergency department, a digital block was provided and orthopedic surgery was contacted.  Recommendation was for Ancef, n.p.o. status, and partial amputation in a.m.  Hospitalist service contacted to admit.    Incidentally, patient found to have a positive COVID-19 test.  He has had no cough or shortness of breath, no  nasal congestion.  He does not recall any recent illness.  Tells me he has not recently tested himself for COVID as he has had no symptoms.  He has been vaccinated x2.  No hypoxia    Met with patient as well as his brother at bedside.  They have questions regarding extent of surgery and to what degree his finger will be salvageable; he tells me that he is still able to move his finger up to the distal phalanx despite degloving.  I deferred questions regarding extent of surgery to his surgical team other than to tell him that, to some degree, ability to preserve more distal finger might be limited by viability of degloved skin without risk of infection.  Patient is understanding of this.    Pain is controlled now.  Finger was dressed in the emergency department and gauze.    Patient with no other complaints    Review of Systems    The 10 point Review of Systems is negative other than noted in the HPI or here.  No fevers or chills  No shortness of breath  No cough  No nausea, vomiting, or diarrhea    Past Medical History    I have reviewed this patient's medical history and updated it with pertinent information if needed.   Hypothyroidism    Past Surgical History   I have reviewed this patient's surgical history and updated it with pertinent information if needed.   patient reports that he has never had a prior surgery    Social History   I have reviewed this patient's social history and updated it with pertinent information if needed.  Social History     Tobacco Use     Smoking status: Never   Substance Use Topics     Alcohol use:  Occasional, infrequent     Drug use: No       Family History     Patient tells me that his mother and father have issues with hypertension and blood sugars, though are otherwise healthy  No family history of blood clots or bleeding disorders  No family history of anesthesia reaction    Prior to Admission Medications   Levothyroxine 137 mcg     Allergies   No Known Allergies    Physical  Exam   Vital Signs: Temp: 96.9  F (36.1  C) Temp src: Temporal BP: 110/81 Pulse: 73   Resp: 17 SpO2: 93 % O2 Device: None (Room air)    Weight: 150 lbs 0 oz    General Appearance: Generally well-appearing 42-year-old male sitting on hospital [facility].  Slightly pallorous appearing, though not in acute distress.  Eyes: Mild scleral injection bilaterally, no scleral icterus.  HEENT: Normocephalic and atraumatic  Respiratory: Breath sounds pristine bilaterally with no wheezes, no crackles, excellent effort with good air movement throughout lung fields.  Cardiovascular: Regular rate and rhythm, no murmur  GI: Abdomen soft, nontender palpation.  No palpable mass.  Lymph/Hematologic: No lower extremity edema  Genitourinary: Not examined  Skin: Left hand currently dressed and not assessed.  Otherwise no jaundice, no petechiae, multiple tattoos on chest and arms bilaterally  Musculoskeletal: Muscular tone and bulk intact in all extremities and appropriate for age.  Again, left hand dressed and not examined as will not  currently  Neurologic: Alert, conversant, appropriate in conversation.  Mental status is grossly intact.  Quite fluent in English, though utilized a telephone  for clarification of any terms he might not understand.  Psychiatric: Normal affect, pleasant    Data   Data reviewed today: I reviewed all medications, new labs and imaging results over the last 24 hours. I personally reviewed the Hand x-ray image(s) showing Left third finger degloving and distal phalanx displacement.    Recent Labs   Lab 10/23/22  1844   WBC 12.8*   HGB 14.5   MCV 87   *   INR 1.06      POTASSIUM 3.7   CHLORIDE 110*   CO2 28   BUN 25   CR 1.22   ANIONGAP 4   ELIEZER 9.0   *   ALBUMIN 4.1   PROTTOTAL 7.7   BILITOTAL 0.3   ALKPHOS 66   ALT 40   AST 21

## (undated) DEVICE — LINEN TOWEL PACK X5 5464

## (undated) DEVICE — SOL WATER IRRIG 1000ML BOTTLE 2F7114

## (undated) DEVICE — BLADE KNIFE SURG 15 371115

## (undated) DEVICE — GLOVE BIOGEL PI SZ 8.0 40880

## (undated) DEVICE — SU ETHILON 4-0 FS-2 18" 662H

## (undated) DEVICE — PACK HAND WRIST SOP15HWFSP

## (undated) DEVICE — GLOVE BIOGEL PI MICRO INDICATOR UNDERGLOVE SZ 8.0 48980

## (undated) DEVICE — SOL NACL 0.9% IRRIG 1000ML BOTTLE 2F7124

## (undated) DEVICE — ESU GROUND PAD UNIVERSAL W/O CORD

## (undated) DEVICE — CAST PADDING 4" STERILE 9044S

## (undated) RX ORDER — CEFAZOLIN SODIUM 1 G/3ML
INJECTION, POWDER, FOR SOLUTION INTRAMUSCULAR; INTRAVENOUS
Status: DISPENSED
Start: 2022-10-24

## (undated) RX ORDER — ONDANSETRON 2 MG/ML
INJECTION INTRAMUSCULAR; INTRAVENOUS
Status: DISPENSED
Start: 2022-10-24

## (undated) RX ORDER — DEXAMETHASONE SODIUM PHOSPHATE 4 MG/ML
INJECTION, SOLUTION INTRA-ARTICULAR; INTRALESIONAL; INTRAMUSCULAR; INTRAVENOUS; SOFT TISSUE
Status: DISPENSED
Start: 2022-10-24

## (undated) RX ORDER — PROPOFOL 10 MG/ML
INJECTION, EMULSION INTRAVENOUS
Status: DISPENSED
Start: 2022-10-24

## (undated) RX ORDER — FENTANYL CITRATE 50 UG/ML
INJECTION, SOLUTION INTRAMUSCULAR; INTRAVENOUS
Status: DISPENSED
Start: 2022-10-24

## (undated) RX ORDER — GINSENG 100 MG
CAPSULE ORAL
Status: DISPENSED
Start: 2022-10-24

## (undated) RX ORDER — BUPIVACAINE HYDROCHLORIDE 5 MG/ML
INJECTION, SOLUTION EPIDURAL; INTRACAUDAL
Status: DISPENSED
Start: 2022-10-24

## (undated) RX ORDER — LIDOCAINE HYDROCHLORIDE 20 MG/ML
INJECTION, SOLUTION EPIDURAL; INFILTRATION; INTRACAUDAL; PERINEURAL
Status: DISPENSED
Start: 2022-10-24